# Patient Record
Sex: FEMALE | Race: WHITE | Employment: PART TIME | URBAN - METROPOLITAN AREA
[De-identification: names, ages, dates, MRNs, and addresses within clinical notes are randomized per-mention and may not be internally consistent; named-entity substitution may affect disease eponyms.]

---

## 2017-09-26 ENCOUNTER — OFFICE VISIT (OUTPATIENT)
Dept: FAMILY MEDICINE CLINIC | Age: 27
End: 2017-09-26
Payer: COMMERCIAL

## 2017-09-26 VITALS
DIASTOLIC BLOOD PRESSURE: 72 MMHG | HEART RATE: 74 BPM | BODY MASS INDEX: 31.92 KG/M2 | TEMPERATURE: 98.2 F | WEIGHT: 187 LBS | HEIGHT: 64 IN | SYSTOLIC BLOOD PRESSURE: 112 MMHG | OXYGEN SATURATION: 99 %

## 2017-09-26 DIAGNOSIS — R13.10 DYSPHAGIA, UNSPECIFIED TYPE: Primary | ICD-10-CM

## 2017-09-26 DIAGNOSIS — R00.2 PALPITATION: ICD-10-CM

## 2017-09-26 DIAGNOSIS — L30.9 DERMATITIS: ICD-10-CM

## 2017-09-26 DIAGNOSIS — Z23 FLU VACCINE NEED: ICD-10-CM

## 2017-09-26 PROCEDURE — 93000 ELECTROCARDIOGRAM COMPLETE: CPT | Performed by: PHYSICIAN ASSISTANT

## 2017-09-26 PROCEDURE — 90654 INFLUENZA, TRIV, 18-64 YRS, ID, PF, MICRO INJ, 0.1ML (FLUZONE TRIV, PF, ID): CPT | Performed by: PHYSICIAN ASSISTANT

## 2017-09-26 PROCEDURE — 90471 IMMUNIZATION ADMIN: CPT | Performed by: PHYSICIAN ASSISTANT

## 2017-09-26 PROCEDURE — 99214 OFFICE O/P EST MOD 30 MIN: CPT | Performed by: PHYSICIAN ASSISTANT

## 2017-09-26 RX ORDER — OXCARBAZEPINE 150 MG/1
150 TABLET, FILM COATED ORAL 2 TIMES DAILY
COMMUNITY
End: 2019-05-09 | Stop reason: SDUPTHER

## 2017-09-26 ASSESSMENT — ENCOUNTER SYMPTOMS
SHORTNESS OF BREATH: 0
CONSTIPATION: 0
SORE THROAT: 0
DIARRHEA: 0
ABDOMINAL PAIN: 0
NAUSEA: 0
TROUBLE SWALLOWING: 1
COLOR CHANGE: 0
VOMITING: 0
COUGH: 0
WHEEZING: 0

## 2017-09-27 DIAGNOSIS — R00.2 PALPITATION: ICD-10-CM

## 2018-09-07 ENCOUNTER — OFFICE VISIT (OUTPATIENT)
Dept: FAMILY MEDICINE CLINIC | Age: 28
End: 2018-09-07
Payer: MEDICARE

## 2018-09-07 VITALS
BODY MASS INDEX: 30.9 KG/M2 | HEART RATE: 87 BPM | HEIGHT: 64 IN | DIASTOLIC BLOOD PRESSURE: 68 MMHG | WEIGHT: 181 LBS | SYSTOLIC BLOOD PRESSURE: 114 MMHG | OXYGEN SATURATION: 98 %

## 2018-09-07 DIAGNOSIS — G56.91 NEUROPATHY OF RIGHT UPPER EXTREMITY: Primary | ICD-10-CM

## 2018-09-07 PROCEDURE — G8427 DOCREV CUR MEDS BY ELIG CLIN: HCPCS | Performed by: PHYSICIAN ASSISTANT

## 2018-09-07 PROCEDURE — 99213 OFFICE O/P EST LOW 20 MIN: CPT | Performed by: PHYSICIAN ASSISTANT

## 2018-09-07 PROCEDURE — G8417 CALC BMI ABV UP PARAM F/U: HCPCS | Performed by: PHYSICIAN ASSISTANT

## 2018-09-07 PROCEDURE — 1036F TOBACCO NON-USER: CPT | Performed by: PHYSICIAN ASSISTANT

## 2018-09-07 RX ORDER — IBUPROFEN 800 MG/1
800 TABLET ORAL EVERY 8 HOURS PRN
Qty: 90 TABLET | Refills: 0 | Status: SHIPPED | OUTPATIENT
Start: 2018-09-07 | End: 2019-05-28

## 2018-09-07 ASSESSMENT — ENCOUNTER SYMPTOMS
COLOR CHANGE: 0
BACK PAIN: 0

## 2018-09-07 ASSESSMENT — PATIENT HEALTH QUESTIONNAIRE - PHQ9
1. LITTLE INTEREST OR PLEASURE IN DOING THINGS: 0
SUM OF ALL RESPONSES TO PHQ QUESTIONS 1-9: 0
SUM OF ALL RESPONSES TO PHQ9 QUESTIONS 1 & 2: 0
SUM OF ALL RESPONSES TO PHQ QUESTIONS 1-9: 0
2. FEELING DOWN, DEPRESSED OR HOPELESS: 0

## 2018-09-07 NOTE — PROGRESS NOTES
OXcarbazepine (TRILEPTAL) 150 MG tablet Take 150 mg by mouth 2 times daily       No current facility-administered medications for this visit. Allergies   Allergen Reactions    Latex     Claritin [Loratadine]        Health Maintenance   Topic Date Due    DTaP/Tdap/Td vaccine (1 - Tdap) 09/28/2009    Flu vaccine (1) 09/01/2018    Cervical cancer screen  10/07/2019    HIV screen  Completed       Subjective:      Review of Systems   Constitutional: Negative for activity change, appetite change, fatigue and fever. Musculoskeletal: Positive for myalgias. Negative for arthralgias, back pain, gait problem, joint swelling, neck pain and neck stiffness. Skin: Negative for color change, pallor, rash and wound. Neurological: Positive for numbness. Negative for weakness. Hematological: Negative for adenopathy. Psychiatric/Behavioral: Negative for sleep disturbance. The patient is not nervous/anxious. Objective:     Physical Exam   Constitutional: She is oriented to person, place, and time. She appears well-developed and well-nourished. HENT:   Head: Normocephalic and atraumatic. Musculoskeletal: She exhibits no edema (UE). Right shoulder: She exhibits normal range of motion and no tenderness. Right elbow: She exhibits normal range of motion, no swelling, no effusion, no deformity and no laceration. No tenderness found. Right wrist: She exhibits tenderness (positive tinels/phalens). She exhibits normal range of motion, no bony tenderness, no swelling, no effusion, no crepitus, no deformity and no laceration. Right hand: She exhibits normal range of motion, no tenderness, normal capillary refill and no swelling. Normal sensation noted. Normal strength noted. Neurological: She is alert and oriented to person, place, and time. Skin: Skin is warm and dry. No rash noted. No erythema. No pallor. Psychiatric: She has a normal mood and affect.  Her behavior is normal. Judgment and thought content normal.   Nursing note and vitals reviewed. /68   Pulse 87   Ht 5' 4\" (1.626 m)   Wt 181 lb (82.1 kg)   SpO2 98%   BMI 31.07 kg/m²     Assessment:       Diagnosis Orders   1. Neuropathy of right upper extremity  ibuprofen (IBU) 800 MG tablet    EMG             Plan:      Return in about 3 weeks (around 9/28/2018) for EMG review. Concern for CTS, ? Ulnar involvement as well  Patient will cont with bracing at night  Stretches demonstrated to start 3 times day for 30 sec each  Add ibu  Will order EMG for further eval  Follow up to review after results available  Patient agreed with treatment plan      Orders Placed This Encounter   Procedures    EMG     Standing Status:   Future     Standing Expiration Date:   12/7/2018     Order Specific Question:   Which body part? Answer:   RUE     Orders Placed This Encounter   Medications    ibuprofen (IBU) 800 MG tablet     Sig: Take 1 tablet by mouth every 8 hours as needed for Pain (take with food) Take with food     Dispense:  90 tablet     Refill:  0       Patient given educational materials - see patient instructions. Discussed use, benefit, and side effects of prescribed medications. All patient questions answered. Pt voiced understanding. Reviewed health maintenance. Instructed to continue current medications, diet and exercise. Patient agreed with treatment plan. Follow up as directed.      Electronically signed by Vivian Mo PA-C on 9/7/2018 at 11:20 AM

## 2018-09-07 NOTE — PROGRESS NOTES
Visit Information    Have you changed or started any medications since your last visit including any over-the-counter medicines, vitamins, or herbal medicines? no   Have you stopped taking any of your medications? Is so, why? -  no  Are you having any side effects from any of your medications? - no    Have you seen any other physician or provider since your last visit?  no   Have you had any other diagnostic tests since your last visit?  no   Have you been seen in the emergency room and/or had an admission in a hospital since we last saw you?  no   Have you had your routine dental cleaning in the past 6 months?  no     Do you have an active MyChart account? If no, what is the barrier?   Yes    Patient Care Team:  BRIGHT Moralez - CNP as PCP - General (Nurse Practitioner)    Medical History Review  Past Medical, Family, and Social History reviewed and does contribute to the patient presenting condition    Health Maintenance   Topic Date Due    DTaP/Tdap/Td vaccine (1 - Tdap) 09/28/2009    Flu vaccine (1) 09/01/2018    Cervical cancer screen  10/07/2019    HIV screen  Completed

## 2019-05-09 ENCOUNTER — OFFICE VISIT (OUTPATIENT)
Dept: FAMILY MEDICINE CLINIC | Age: 29
End: 2019-05-09
Payer: MEDICARE

## 2019-05-09 VITALS
SYSTOLIC BLOOD PRESSURE: 112 MMHG | HEIGHT: 64 IN | HEART RATE: 102 BPM | OXYGEN SATURATION: 98 % | DIASTOLIC BLOOD PRESSURE: 74 MMHG | BODY MASS INDEX: 31.79 KG/M2 | WEIGHT: 186.2 LBS

## 2019-05-09 DIAGNOSIS — M75.22 BICIPITAL TENDONITIS OF LEFT SHOULDER: Primary | ICD-10-CM

## 2019-05-09 PROCEDURE — G8417 CALC BMI ABV UP PARAM F/U: HCPCS | Performed by: PHYSICIAN ASSISTANT

## 2019-05-09 PROCEDURE — 1036F TOBACCO NON-USER: CPT | Performed by: PHYSICIAN ASSISTANT

## 2019-05-09 PROCEDURE — G8427 DOCREV CUR MEDS BY ELIG CLIN: HCPCS | Performed by: PHYSICIAN ASSISTANT

## 2019-05-09 PROCEDURE — 99213 OFFICE O/P EST LOW 20 MIN: CPT | Performed by: PHYSICIAN ASSISTANT

## 2019-05-09 RX ORDER — IBUPROFEN 600 MG/1
600 TABLET ORAL EVERY 8 HOURS PRN
Qty: 30 TABLET | Refills: 0 | Status: SHIPPED | OUTPATIENT
Start: 2019-05-09 | End: 2019-05-28

## 2019-05-09 RX ORDER — OXCARBAZEPINE 150 MG/1
150 TABLET, FILM COATED ORAL 2 TIMES DAILY
Qty: 60 TABLET | Refills: 0 | Status: SHIPPED | OUTPATIENT
Start: 2019-05-09 | End: 2019-07-24 | Stop reason: SDUPTHER

## 2019-05-09 ASSESSMENT — ENCOUNTER SYMPTOMS
COLOR CHANGE: 0
BACK PAIN: 0

## 2019-05-09 NOTE — PROGRESS NOTES
[address] [address]  Dept: [phone]  Dept Fax: [phone]     Edi Umana is a 29 y.o. female who presents today for her medical conditions/complaintsas noted below. Edi Umana is c/o of   Chief Complaint   Patient presents with    Shoulder Pain     Left shoulder pain,       HPI:      Shoulder Pain    The pain is present in the left shoulder. This is a chronic problem. The current episode started more than 1 year ago. There has been no history of extremity trauma. The problem occurs daily. The problem has been waxing and waning. The quality of the pain is described as aching. The pain is mild. Pertinent negatives include no fever, inability to bear weight, itching, joint locking, joint swelling, limited range of motion, numbness, stiffness or tingling. The symptoms are aggravated by activity. She has tried NSAIDS for the symptoms. The treatment provided mild relief. patient states joints were hyper when she was younger she would twist her shoulders around behind her back. Has had off and on pain through her whole life. Noticing it more recently. No new truama    Patient also requesting refill on her trileptal. She is inbetween psychiatry appointments and is requesting refill until she is in with her new doctor.  Still seeing counseling regulalry    No results found for: LABA1C          ( goal A1Cis < 7)   No results found for: LABMICR  No results found for: LDLCHOLESTEROL, LDLCALC    (goal LDL is <100)   AST (U/L)   Date Value   02/05/2017 21     ALT (U/L)   Date Value   02/05/2017 14     BUN (mg/dL)   Date Value   02/05/2017 14     BP Readings from Last 3 Encounters:   05/09/19 112/74   09/07/18 114/68   09/26/17 112/72          (goal 120/80)    Past Medical History:   Diagnosis Date    Anxiety     Borderline personality disorder (HCC)     Depression     Dysphagia     Latex allergy     Psoriasis       History reviewed. No pertinent surgical history. History reviewed. No pertinent family history. Social History     Tobacco Use    Smoking status: Never Smoker    Smokeless tobacco: Never Used   Substance Use Topics    Alcohol use: No         Current Outpatient Medications   Medication Sig Dispense Refill    ibuprofen (ADVIL;MOTRIN) 600 MG tablet Take 1 tablet by mouth every 8 hours as needed for Pain 30 tablet 0    OXcarbazepine (TRILEPTAL) 150 MG tablet Take 1 tablet by mouth 2 times daily 60 tablet 0    ibuprofen (IBU) 800 MG tablet Take 1 tablet by mouth every 8 hours as needed for Pain (take with food) Take with food 90 tablet 0     No current facility-administered medications for this visit. Allergies   Allergen Reactions    Latex     Claritin [Loratadine]        Health Maintenance   Topic Date Due    Varicella Vaccine (1 of 2 - 13+ 2-dose series) 09/28/2003    DTaP/Tdap/Td vaccine (1 - Tdap) 09/28/2009    Flu vaccine (Season Ended) 09/01/2019    Cervical cancer screen  10/07/2019    HIV screen  Completed    Pneumococcal 0-64 years Vaccine  Aged Out       Subjective:     Review of Systems   Constitutional: Negative for activity change, appetite change, fatigue and fever. Musculoskeletal: Positive for arthralgias. Negative for back pain, gait problem, joint swelling, myalgias and stiffness. Skin: Negative for color change, itching, pallor, rash and wound. Neurological: Negative for tingling, weakness and numbness. Hematological: Negative for adenopathy. Psychiatric/Behavioral: The patient is not nervous/anxious. Objective:     Physical Exam   Constitutional: She is oriented to person, place, and time. She appears well-developed and well-nourished. HENT:   Head: Normocephalic and atraumatic. Musculoskeletal:        Left shoulder: She exhibits tenderness (over bicipital tendon).  She exhibits normal range of motion, no bony tenderness, no swelling, no

## 2019-05-09 NOTE — PATIENT INSTRUCTIONS
Patient Education        Shoulder Stretches: Exercises  Your Care Instructions  Here are some examples of exercises for your shoulder. Start each exercise slowly. Ease off the exercise if you start to have pain. Your doctor or physical therapist will tell you when you can start these exercises and which ones will work best for you. How to do the exercises  Shoulder stretch    1.  a doorway and place one arm against the door frame. Your elbow should be a little higher than your shoulder. 2. Relax your shoulders as you lean forward, allowing your chest and shoulder muscles to stretch. You can also turn your body slightly away from your arm to stretch the muscles even more. 3. Hold for 15 to 30 seconds. 4. Repeat 2 to 4 times with each arm. Shoulder and chest stretch    1. Shoulder and chest stretch  2. While sitting, relax your upper body so you slump slightly in your chair. 3. As you breathe in, straighten your back and open your arms out to the sides. 4. Gently pull your shoulder blades back and downward. 5. Hold for 15 to 30 seconds as your breathe normally. 6. Repeat 2 to 4 times. Overhead stretch    1. Reach up over your head with both arms. 2. Hold for 15 to 30 seconds. 3. Repeat 2 to 4 times. Follow-up care is a key part of your treatment and safety. Be sure to make and go to all appointments, and call your doctor if you are having problems. It's also a good idea to know your test results and keep a list of the medicines you take. Where can you learn more? Go to https://[email or web address]. org and sign in to your [employer] account. Enter S254 in the [facility] box to learn more about \"Shoulder Stretches: Exercises. \"     If you do not have an account, please click on the \"Sign Up Now\" link. Current as of: September 20, 2018  Content Version: 12.0  © 2290-2502 Healthwise, Inc[employer]. Care instructions adapted under license by [facility] ([facility]).  If you have questions about a medical condition or this instruction, always ask your healthcare professional. [place] [address] any warranty or liability for your use of this information.

## 2019-05-09 NOTE — PROGRESS NOTES
Visit Information    Have you changed or started any medications since your last visit including any over-the-counter medicines, vitamins, or herbal medicines? no   Have you stopped taking any of your medications? Is so, why? -  no  Are you having any side effects from any of your medications? - no    Have you seen any other physician or provider since your last visit?  no   Have you had any other diagnostic tests since your last visit?  no   Have you been seen in the emergency room and/or had an admission in a hospital since we last saw you?  no   Have you had your routine dental cleaning in the past 6 months?  no     Do you have an active MyChart account? If no, what is the barrier?   Yes    Patient Care Team:  Chantal Antonio PA-C as PCP - General (Family Medicine)    Medical History Review  Past Medical, Family, and Social History reviewed and does contribute to the patient presenting condition    Health Maintenance   Topic Date Due    Varicella Vaccine (1 of 2 - 13+ 2-dose series) 09/28/2003    DTaP/Tdap/Td vaccine (1 - Tdap) 09/28/2009    Flu vaccine (Season Ended) 09/01/2019    Cervical cancer screen  10/07/2019    HIV screen  Completed    Pneumococcal 0-64 years Vaccine  Aged Out

## 2019-05-28 ENCOUNTER — OFFICE VISIT (OUTPATIENT)
Dept: FAMILY MEDICINE CLINIC | Age: 29
End: 2019-05-28
Payer: MEDICARE

## 2019-05-28 VITALS
DIASTOLIC BLOOD PRESSURE: 70 MMHG | BODY MASS INDEX: 32.1 KG/M2 | SYSTOLIC BLOOD PRESSURE: 110 MMHG | WEIGHT: 188 LBS | HEART RATE: 103 BPM | HEIGHT: 64 IN | OXYGEN SATURATION: 98 %

## 2019-05-28 DIAGNOSIS — M25.512 ACUTE PAIN OF LEFT SHOULDER: Primary | ICD-10-CM

## 2019-05-28 PROCEDURE — G8427 DOCREV CUR MEDS BY ELIG CLIN: HCPCS | Performed by: PHYSICIAN ASSISTANT

## 2019-05-28 PROCEDURE — 99213 OFFICE O/P EST LOW 20 MIN: CPT | Performed by: PHYSICIAN ASSISTANT

## 2019-05-28 PROCEDURE — 1036F TOBACCO NON-USER: CPT | Performed by: PHYSICIAN ASSISTANT

## 2019-05-28 PROCEDURE — G8417 CALC BMI ABV UP PARAM F/U: HCPCS | Performed by: PHYSICIAN ASSISTANT

## 2019-05-28 ASSESSMENT — ENCOUNTER SYMPTOMS
COLOR CHANGE: 0
BACK PAIN: 0

## 2019-05-28 NOTE — PROGRESS NOTES
[address] [facility]  [address] [place], [place] [address] [address]  Dept: [phone]  Dept Fax: [phone]     Jaky Valencia is a 29 y.o. female who presents today for her medical conditions/complaintsas noted below. Jaky Valencia is c/o of   Chief Complaint   Patient presents with    Shoulder Pain     Left shoulder pain. HPI:      Shoulder Pain    The pain is present in the left shoulder. This is a new problem. The current episode started more than 1 month ago. There has been no history of extremity trauma. The problem occurs constantly. The problem has been gradually worsening. The quality of the pain is described as aching. The pain is mild. Associated symptoms include a limited range of motion. Pertinent negatives include no fever, inability to bear weight, itching, joint locking, joint swelling, numbness, stiffness or tingling. She has tried NSAIDS for the symptoms. The treatment provided no relief. patient tried nsaid but was not helping much  Physically active at work but trying not to lift over 10 pounds  No new trauma    No results found for: LABA1C          ( goal A1Cis < 7)   No results found for: LABMICR  No results found for: LDLCHOLESTEROL, LDLCALC    (goal LDL is <100)   AST (U/L)   Date Value   02/05/2017 21     ALT (U/L)   Date Value   02/05/2017 14     BUN (mg/dL)   Date Value   02/05/2017 14     BP Readings from Last 3 Encounters:   05/28/19 110/70   05/09/19 112/74   09/07/18 114/68          (goal 120/80)    Past Medical History:   Diagnosis Date    Anxiety     Borderline personality disorder ([facility] [address]     Depression     Dysphagia     Latex allergy     Psoriasis       History reviewed. No pertinent surgical history. History reviewed. No pertinent family history. Social History     Tobacco Use    Smoking status: Never Smoker    Smokeless tobacco: Never Used   Substance Use Topics    Alcohol use:  No Current Outpatient Medications   Medication Sig Dispense Refill    OXcarbazepine (TRILEPTAL) 150 MG tablet Take 1 tablet by mouth 2 times daily 60 tablet 0     No current facility-administered medications for this visit. Allergies   Allergen Reactions    Latex     Claritin [Loratadine]        Health Maintenance   Topic Date Due    Varicella Vaccine (1 of 2 - 13+ 2-dose series) 09/28/2003    DTaP/Tdap/Td vaccine (1 - Tdap) 09/28/2009    Flu vaccine (Season Ended) 09/01/2019    Cervical cancer screen  10/07/2019    HIV screen  Completed    Pneumococcal 0-64 years Vaccine  Aged Out       Subjective:     Review of Systems   Constitutional: Negative for activity change, appetite change, fatigue and fever. Musculoskeletal: Positive for arthralgias. Negative for back pain, gait problem, joint swelling, myalgias and stiffness. Skin: Negative for color change, itching, pallor, rash and wound. Neurological: Negative for tingling, weakness and numbness. Hematological: Negative for adenopathy. Psychiatric/Behavioral: Negative for sleep disturbance. The patient is not nervous/anxious. Objective:     Physical Exam   Constitutional: She is oriented to person, place, and time. She appears well-developed and well-nourished. HENT:   Head: Normocephalic and atraumatic. Musculoskeletal:        Left shoulder: She exhibits decreased range of motion and tenderness. She exhibits no bony tenderness, no swelling, no effusion, no crepitus, no deformity, no laceration, no pain, no spasm and normal pulse. Neurological: She is alert and oriented to person, place, and time. No cranial nerve deficit. Coordination normal.   Skin: Skin is warm and dry. No rash noted. No erythema. No pallor. Psychiatric: She has a normal mood and affect. Her behavior is normal. Judgment and thought content normal.   Nursing note and vitals reviewed.     /70   Pulse 103   Ht 5' 4\" (1.626 m)   Wt 188 lb (85.3 kg)   SpO2 98%   BMI 32.27 kg/m²     Assessment:          Diagnosis Orders   1. Acute pain of left shoulder  XR SHOULDER LEFT (MIN 2 VIEWS)    Amb External Referral To Physical Therapy             Plan:      Return if symptoms worsen or fail to improve. Xray today, no noted fractures  Await rad report  Set up to start PT  Heat/ice to area. nsaid with food if helps  Patient agreed with treatment plan  Follow up if persisting    Orders Placed This Encounter   Procedures    XR SHOULDER LEFT (MIN 2 VIEWS)     Standing Status:   Future     Number of Occurrences:   1     Standing Expiration Date:   5/27/2020     Order Specific Question:   Reason for exam:     Answer:   pain    Amb External Referral To Physical Therapy     Referral Priority:   Routine     Referral Type:   Consult for Advice and Opinion     Referral Reason:   Specialty Services Required     Requested Specialty:   Physical Therapy     Number of Visits Requested:   1          Patient given educational materials - see patient instructions. Discussed use, benefit, and side effects of prescribed medications. All patientquestions answered. Pt voiced understanding. Reviewed health maintenance. Instructedto continue current medications, diet and exercise. Patient agreed with treatmentplan. Follow up as directed.      Electronically signed by Bushra Fallon PA-C on 5/28/2019 at 3:38 PM

## 2019-05-28 NOTE — PROGRESS NOTES
Visit Information    Have you changed or started any medications since your last visit including any over-the-counter medicines, vitamins, or herbal medicines? no   Have you stopped taking any of your medications? Is so, why? -  no  Are you having any side effects from any of your medications? - no    Have you seen any other physician or provider since your last visit?  no   Have you had any other diagnostic tests since your last visit?  no   Have you been seen in the emergency room and/or had an admission in a hospital since we last saw you?  no   Have you had your routine dental cleaning in the past 6 months?  no     Do you have an active MyChart account? If no, what is the barrier?   Yes    Patient Care Team:  Chetna Norris PA-C as PCP - General (Family Medicine)    Medical History Review  Past Medical, Family, and Social History reviewed and does contribute to the patient presenting condition    Health Maintenance   Topic Date Due    Varicella Vaccine (1 of 2 - 13+ 2-dose series) 09/28/2003    DTaP/Tdap/Td vaccine (1 - Tdap) 09/28/2009    Flu vaccine (Season Ended) 09/01/2019    Cervical cancer screen  10/07/2019    HIV screen  Completed    Pneumococcal 0-64 years Vaccine  Aged Out

## 2019-06-12 ENCOUNTER — APPOINTMENT (OUTPATIENT)
Dept: CT IMAGING | Age: 29
End: 2019-06-12
Payer: MEDICARE

## 2019-06-12 ENCOUNTER — HOSPITAL ENCOUNTER (EMERGENCY)
Age: 29
Discharge: HOME | End: 2019-06-12
Attending: EMERGENCY MEDICINE
Payer: MEDICARE

## 2019-06-12 VITALS
HEIGHT: 64 IN | OXYGEN SATURATION: 100 % | DIASTOLIC BLOOD PRESSURE: 71 MMHG | SYSTOLIC BLOOD PRESSURE: 107 MMHG | RESPIRATION RATE: 16 BRPM | TEMPERATURE: 97.7 F | WEIGHT: 188.1 LBS | HEART RATE: 83 BPM | BODY MASS INDEX: 32.11 KG/M2

## 2019-06-12 DIAGNOSIS — R51.9 ACUTE NONINTRACTABLE HEADACHE, UNSPECIFIED HEADACHE TYPE: Primary | ICD-10-CM

## 2019-06-12 PROCEDURE — 70450 CT HEAD/BRAIN W/O DYE: CPT

## 2019-06-12 PROCEDURE — 6360000002 HC RX W HCPCS: Performed by: EMERGENCY MEDICINE

## 2019-06-12 PROCEDURE — 99283 EMERGENCY DEPT VISIT LOW MDM: CPT

## 2019-06-12 PROCEDURE — 96372 THER/PROPH/DIAG INJ SC/IM: CPT

## 2019-06-12 RX ORDER — BUTALBITAL, ACETAMINOPHEN AND CAFFEINE 300; 40; 50 MG/1; MG/1; MG/1
1 CAPSULE ORAL EVERY 6 HOURS PRN
Qty: 20 CAPSULE | Refills: 0 | Status: SHIPPED | OUTPATIENT
Start: 2019-06-12 | End: 2020-08-17

## 2019-06-12 RX ORDER — KETOROLAC TROMETHAMINE 30 MG/ML
60 INJECTION, SOLUTION INTRAMUSCULAR; INTRAVENOUS ONCE
Status: COMPLETED | OUTPATIENT
Start: 2019-06-12 | End: 2019-06-12

## 2019-06-12 RX ADMIN — KETOROLAC TROMETHAMINE 60 MG: 30 INJECTION, SOLUTION INTRAMUSCULAR at 02:00

## 2019-06-12 ASSESSMENT — ENCOUNTER SYMPTOMS
SHORTNESS OF BREATH: 0
CONSTIPATION: 0
COLOR CHANGE: 0
VOMITING: 0
EYE REDNESS: 0
ABDOMINAL PAIN: 0
EYE DISCHARGE: 0
COUGH: 0
DIARRHEA: 0
FACIAL SWELLING: 0
EYE PAIN: 1

## 2019-06-12 ASSESSMENT — VISUAL ACUITY
OD: 20/70
OU: 20/100
OS: 20/100

## 2019-06-12 ASSESSMENT — PAIN SCALES - GENERAL
PAINLEVEL_OUTOF10: 7
PAINLEVEL_OUTOF10: 7

## 2019-06-12 NOTE — LETTER
[facility] ED  [facility] [address]  Phone: [phone]               June 12, 2019    Patient: Selene Delacruz   YOB: 1990   Date of Visit: 6/12/2019       To Whom It May Concern:    Veronica Cyr was seen and treated in our emergency department on 6/12/2019. She may return to work on 6/13/2019.       Sincerely,       Summer Araya RN         Signature:__________________________________

## 2019-06-12 NOTE — ED PROVIDER NOTES
chest pain. Gastrointestinal: Negative for abdominal pain, constipation, diarrhea and vomiting. Genitourinary: Negative for dysuria and hematuria. Musculoskeletal: Negative for arthralgias. Skin: Negative for color change and rash. Neurological: Negative for syncope, numbness and headaches. Hematological: Negative for adenopathy. Psychiatric/Behavioral: Negative for confusion. The patient is not nervous/anxious. Except as noted above the remainder of the review of systems was reviewed and negative. PHYSICAL EXAM    (up to 7 for level 4, 8 or more for level 5)     Vitals:    06/12/19 0127 06/12/19 0129   BP: 107/71    Pulse: 83    Resp: 16    Temp: 97.7 °F (36.5 °C)    TempSrc: Oral    SpO2: 100%    Weight:  188 lb 1.6 oz (85.3 kg)   Height:  5' 4\" (1.626 m)       Physical Exam   Constitutional: She is oriented to person, place, and time. She appears well-developed and well-nourished. No distress. She is lying in a darkened room with a towel over her eyes. HENT:   Head: Normocephalic and atraumatic. Eyes: Pupils are equal, round, and reactive to light. Conjunctivae and EOM are normal. Right eye exhibits no discharge. Left eye exhibits no discharge. No scleral icterus. Neck: Neck supple. Cardiovascular: Normal rate and regular rhythm. Pulmonary/Chest: Effort normal and breath sounds normal. No stridor. No respiratory distress. She has no wheezes. She has no rales. Abdominal: Soft. She exhibits no distension. There is no tenderness. Musculoskeletal: Normal range of motion. Lymphadenopathy:     She has no cervical adenopathy. Neurological: She is alert and oriented to person, place, and time. Skin: Skin is warm and dry. No rash noted. She is not diaphoretic. No erythema. Psychiatric: She has a normal mood and affect. Her behavior is normal.   Vitals reviewed.           DIAGNOSTIC RESULTS     EKG: All EKG's are interpreted by the Emergency Department Physician who either signs or Co-signs this chart in the absence of a cardiologist.    Not indicated    RADIOLOGY:   Non-plain film images such as CT, Ultrasound and MRI are read by the radiologist. Plain radiographic images are visualized and preliminarily interpreted by the emergency physician with the below findings:    Interpretation per the Radiologist below, if available at the time of this note:    Ct Head Wo Contrast    Result Date: 6/12/2019  EXAMINATION: CT OF THE HEAD WITHOUT CONTRAST  6/12/2019 1:46 am TECHNIQUE: CT of the head was performed without the administration of intravenous contrast. Dose modulation, iterative reconstruction, and/or weight based adjustment of the mA/kV was utilized to reduce the radiation dose to as low as reasonably achievable. COMPARISON: None. HISTORY: ORDERING SYSTEM PROVIDED HISTORY: bilateral eye pain TECHNOLOGIST PROVIDED HISTORY: FINDINGS: BRAIN/VENTRICLES: There is no acute intracranial hemorrhage, mass effect, or midline shift. There is satisfactory overall gray-white matter differentiation. The ventricular structures are symmetric and unremarkable. The infratentorial structures are unremarkable. ORBITS: The visualized portion of the orbits demonstrate no acute abnormality. SINUSES: The visualized paranasal sinuses and mastoid air cells demonstrate no acute abnormality. SOFT TISSUES/SKULL:  No acute abnormality of the visualized skull or soft tissues. No acute intracranial abnormality. LABS:  Labs Reviewed - No data to display    All other labs were within normal range or not returned as of this dictation.     EMERGENCY DEPARTMENT COURSE and DIFFERENTIAL DIAGNOSIS/MDM:   Vitals:    Vitals:    06/12/19 0127 06/12/19 0129   BP: 107/71    Pulse: 83    Resp: 16    Temp: 97.7 °F (36.5 °C)    TempSrc: Oral    SpO2: 100%    Weight:  188 lb 1.6 oz (85.3 kg)   Height:  5' 4\" (1.626 m)       Orders Placed This Encounter   Medications    ketorolac (TORADOL) injection 60 mg    butalbital-APAP-caffeine (FIORICET) -40 MG CAPS per capsule     Sig: Take 1 capsule by mouth every 6 hours as needed for Headaches     Dispense:  20 capsule     Refill:  0       Medical Decision Making: CT is negative and she is feeling much improved with the IM shot of Toradol. She is discharged home was recommended to have an eye exam.  Treatment diagnosis and follow-up were discussed with the patient. CONSULTS:  None    PROCEDURES:  None    FINAL IMPRESSION      1. Acute nonintractable headache, unspecified headache type          DISPOSITION/PLAN   DISPOSITION Decision To Discharge 06/12/2019 02:32:22 AM      PATIENT REFERRED TO:   Manuel Gonzalez PA-C  [address]   [facility] [address]  [phone]      As needed    [facility] ED  [address] [facility]  [phone]    If symptoms worsen      DISCHARGE MEDICATIONS:     New Prescriptions    BUTALBITAL-APAP-CAFFEINE (FIORICET) -40 MG CAPS PER CAPSULE    Take 1 capsule by mouth every 6 hours as needed for Headaches         (Please note that portions of this note were completed with a voice recognition program.  Efforts were made to edit the dictations but occasionally words are mis-transcribed.)    Shauna Egan MD  Attending Emergency Physician           Shauan Egan MD  06/12/19 2907 Guerrero Delgado MD  06/12/19 7825

## 2019-07-24 RX ORDER — OXCARBAZEPINE 150 MG/1
TABLET, FILM COATED ORAL
Qty: 60 TABLET | Refills: 0 | OUTPATIENT
Start: 2019-07-24

## 2019-07-24 RX ORDER — OXCARBAZEPINE 150 MG/1
150 TABLET, FILM COATED ORAL 2 TIMES DAILY
Qty: 60 TABLET | Refills: 1 | Status: SHIPPED | OUTPATIENT
Start: 2019-07-24 | End: 2019-09-18 | Stop reason: SDUPTHER

## 2019-09-18 RX ORDER — OXCARBAZEPINE 150 MG/1
TABLET, FILM COATED ORAL
Qty: 60 TABLET | Refills: 1 | Status: SHIPPED | OUTPATIENT
Start: 2019-09-18 | End: 2019-11-24 | Stop reason: SDUPTHER

## 2019-11-24 RX ORDER — OXCARBAZEPINE 150 MG/1
TABLET, FILM COATED ORAL
Qty: 60 TABLET | Refills: 1 | Status: SHIPPED | OUTPATIENT
Start: 2019-11-24

## 2020-01-01 ENCOUNTER — HOSPITAL ENCOUNTER (EMERGENCY)
Age: 30
Discharge: HOME | End: 2020-01-01
Attending: EMERGENCY MEDICINE
Payer: MEDICARE

## 2020-01-01 VITALS
RESPIRATION RATE: 16 BRPM | SYSTOLIC BLOOD PRESSURE: 113 MMHG | OXYGEN SATURATION: 98 % | DIASTOLIC BLOOD PRESSURE: 69 MMHG | HEIGHT: 64 IN | HEART RATE: 103 BPM | TEMPERATURE: 97.4 F | WEIGHT: 195.13 LBS | BODY MASS INDEX: 33.31 KG/M2

## 2020-01-01 PROCEDURE — 99283 EMERGENCY DEPT VISIT LOW MDM: CPT

## 2020-01-01 RX ORDER — MECLIZINE HYDROCHLORIDE 25 MG/1
25 TABLET ORAL EVERY 8 HOURS PRN
Qty: 20 TABLET | Refills: 0 | Status: SHIPPED | OUTPATIENT
Start: 2020-01-01 | End: 2020-01-06 | Stop reason: ALTCHOICE

## 2020-01-01 ASSESSMENT — ENCOUNTER SYMPTOMS
EYE DISCHARGE: 0
CONSTIPATION: 0
EYE REDNESS: 0
COUGH: 0
ABDOMINAL PAIN: 0
DIARRHEA: 0
VOMITING: 0
COLOR CHANGE: 0
FACIAL SWELLING: 0
SHORTNESS OF BREATH: 0

## 2020-01-01 ASSESSMENT — PAIN SCALES - GENERAL: PAINLEVEL_OUTOF10: 4

## 2020-01-01 NOTE — ED PROVIDER NOTES
for chills, fatigue and fever. HENT: Positive for ear discharge and ear pain. Negative for congestion and facial swelling. Eyes: Negative for discharge and redness. Respiratory: Negative for cough and shortness of breath. Cardiovascular: Negative for chest pain. Gastrointestinal: Negative for abdominal pain, constipation, diarrhea and vomiting. Genitourinary: Negative for dysuria and hematuria. Musculoskeletal: Negative for arthralgias. Skin: Negative for color change and rash. Neurological: Positive for dizziness. Negative for syncope, numbness and headaches. Hematological: Negative for adenopathy. Psychiatric/Behavioral: Negative for confusion. The patient is not nervous/anxious. Except as noted above the remainder of the review of systems was reviewed and negative. PHYSICAL EXAM    (up to 7 for level 4, 8 or more for level 5)     Vitals:    01/01/20 0956   BP: 113/69   Pulse: 103   Resp: 16   Temp: 97.4 °F (36.3 °C)   TempSrc: Oral   SpO2: 98%   Weight: 195 lb 2 oz (88.5 kg)   Height: 5' 4\" (1.626 m)       Physical Exam  Vitals signs reviewed. Constitutional:       General: She is not in acute distress. Appearance: She is well-developed. She is not diaphoretic. HENT:      Head: Normocephalic and atraumatic. Ears:      Comments: Both external canals have some purulent drainage. Eyes:      General: No scleral icterus. Right eye: No discharge. Left eye: No discharge. Neck:      Musculoskeletal: Neck supple. Cardiovascular:      Rate and Rhythm: Normal rate and regular rhythm. Pulmonary:      Effort: Pulmonary effort is normal. No respiratory distress. Breath sounds: Normal breath sounds. No stridor. No wheezing or rales. Abdominal:      General: There is no distension. Palpations: Abdomen is soft. Tenderness: There is no tenderness. Musculoskeletal: Normal range of motion.    Lymphadenopathy:      Cervical: No cervical adenopathy. Skin:     General: Skin is warm and dry. Findings: No erythema or rash. Neurological:      Mental Status: She is alert and oriented to person, place, and time. Psychiatric:         Behavior: Behavior normal.             DIAGNOSTIC RESULTS     EKG: All EKG's are interpreted by the Emergency Department Physician who either signs or Co-signs this chart in the absence of a cardiologist.    Not indicated    RADIOLOGY:   Non-plain film images such as CT, Ultrasound and MRI are read by the radiologist. Plain radiographic images are visualized and preliminarily interpreted by the emergency physician with the below findings:    Not indicated    Interpretation per the Radiologist below, if available at the time of this note:        LABS:  Labs Reviewed - No data to display    All other labs were within normal range or not returned as of this dictation. EMERGENCY DEPARTMENT COURSE and DIFFERENTIAL DIAGNOSIS/MDM:   Vitals:    Vitals:    01/01/20 0956   BP: 113/69   Pulse: 103   Resp: 16   Temp: 97.4 °F (36.3 °C)   TempSrc: Oral   SpO2: 98%   Weight: 195 lb 2 oz (88.5 kg)   Height: 5' 4\" (1.626 m)       Orders Placed This Encounter   Medications    neomycin-polymyxin-hydrocortisone (CORTISPORIN) 3.5-46674-9 otic solution     Sig: Place 3 drops into both ears 3 times daily for 10 days Instill into both ears     Dispense:  1 Bottle     Refill:  0    meclizine (ANTIVERT) 25 MG tablet     Sig: Take 1 tablet by mouth every 8 hours as needed for Dizziness     Dispense:  20 tablet     Refill:  0       Medical Decision Making: The patient is placed on antibiotic eardrops and will see her doctor in a few days. She will also be treated with Antivert. CONSULTS:  None    PROCEDURES:  None    FINAL IMPRESSION      1.  Acute otitis externa of both ears, unspecified type          DISPOSITION/PLAN   DISPOSITION Decision To Discharge 01/01/2020 10:34:09 AM      PATIENT REFERRED TO:   Kyle Denis PA-C  [id number]

## 2020-01-06 ENCOUNTER — OFFICE VISIT (OUTPATIENT)
Dept: FAMILY MEDICINE CLINIC | Age: 30
End: 2020-01-06
Payer: MEDICARE

## 2020-01-06 VITALS
OXYGEN SATURATION: 99 % | WEIGHT: 195 LBS | BODY MASS INDEX: 33.29 KG/M2 | HEIGHT: 64 IN | TEMPERATURE: 97.8 F | HEART RATE: 90 BPM | SYSTOLIC BLOOD PRESSURE: 120 MMHG | DIASTOLIC BLOOD PRESSURE: 74 MMHG

## 2020-01-06 PROBLEM — F32.A DEPRESSION: Status: ACTIVE | Noted: 2018-07-24

## 2020-01-06 PROBLEM — F41.9 ANXIETY: Status: ACTIVE | Noted: 2018-07-24

## 2020-01-06 PROBLEM — L40.9 PSORIASIS: Status: ACTIVE | Noted: 2018-07-24

## 2020-01-06 PROCEDURE — 99213 OFFICE O/P EST LOW 20 MIN: CPT | Performed by: PHYSICIAN ASSISTANT

## 2020-01-06 PROCEDURE — 1036F TOBACCO NON-USER: CPT | Performed by: PHYSICIAN ASSISTANT

## 2020-01-06 PROCEDURE — 90686 IIV4 VACC NO PRSV 0.5 ML IM: CPT | Performed by: PHYSICIAN ASSISTANT

## 2020-01-06 PROCEDURE — 90471 IMMUNIZATION ADMIN: CPT | Performed by: PHYSICIAN ASSISTANT

## 2020-01-06 PROCEDURE — G8417 CALC BMI ABV UP PARAM F/U: HCPCS | Performed by: PHYSICIAN ASSISTANT

## 2020-01-06 PROCEDURE — G8482 FLU IMMUNIZE ORDER/ADMIN: HCPCS | Performed by: PHYSICIAN ASSISTANT

## 2020-01-06 PROCEDURE — 69210 REMOVE IMPACTED EAR WAX UNI: CPT | Performed by: PHYSICIAN ASSISTANT

## 2020-01-06 PROCEDURE — G8427 DOCREV CUR MEDS BY ELIG CLIN: HCPCS | Performed by: PHYSICIAN ASSISTANT

## 2020-01-06 ASSESSMENT — ENCOUNTER SYMPTOMS
COUGH: 0
COLOR CHANGE: 0
RHINORRHEA: 0
DIARRHEA: 0
ABDOMINAL PAIN: 0

## 2020-01-06 NOTE — PROGRESS NOTES
neomycin-polymyxin-hydrocortisone (CORTISPORIN) 3.5-14971-9 otic solution Place 3 drops into both ears 3 times daily for 10 days Instill into both ears 1 Bottle 0    OXcarbazepine (TRILEPTAL) 150 MG tablet take 1 tablet by mouth twice a day 60 tablet 1    butalbital-APAP-caffeine (FIORICET) -40 MG CAPS per capsule Take 1 capsule by mouth every 6 hours as needed for Headaches 20 capsule 0     No current facility-administered medications for this visit. Allergies   Allergen Reactions    Latex     Claritin [Loratadine]        Health Maintenance   Topic Date Due    Varicella Vaccine (1 of 2 - 2-dose childhood series) 09/28/1991    Cervical cancer screen  10/07/2019    DTaP/Tdap/Td vaccine (2 - Td) 07/24/2028    Flu vaccine  Completed    HIV screen  Completed    Pneumococcal 0-64 years Vaccine  Aged Out       Subjective:     Review of Systems   Constitutional: Negative for activity change, appetite change, fatigue, fever and unexpected weight change. HENT: Positive for ear pain and hearing loss. Negative for ear discharge and rhinorrhea. Respiratory: Negative for cough. Gastrointestinal: Negative for abdominal pain and diarrhea. Skin: Negative for color change, pallor, rash and wound. Neurological: Negative for weakness and numbness. Hematological: Negative for adenopathy. Psychiatric/Behavioral: The patient is not nervous/anxious. Objective:     Physical Exam  Vitals signs and nursing note reviewed. Constitutional:       General: She is not in acute distress. Appearance: Normal appearance. She is well-developed. She is not ill-appearing. HENT:      Head: Normocephalic and atraumatic. Right Ear: Tympanic membrane, ear canal and external ear normal. No tenderness. No middle ear effusion. There is impacted cerumen. No mastoid tenderness. Tympanic membrane is not perforated, erythematous, retracted or bulging.       Left Ear: Tympanic membrane, ear canal and external ear normal. No tenderness. No middle ear effusion. There is impacted cerumen. No mastoid tenderness. Tympanic membrane is not perforated, erythematous, retracted or bulging. Ears:     Skin:     General: Skin is warm and dry. Coloration: Skin is not pale. Findings: No erythema or rash. Neurological:      General: No focal deficit present. Mental Status: She is alert and oriented to person, place, and time. Psychiatric:         Mood and Affect: Mood normal.         Behavior: Behavior normal.         Thought Content: Thought content normal.         Judgment: Judgment normal.       /74   Pulse 90   Temp 97.8 °F (36.6 °C) (Tympanic)   Ht 5' 4\" (1.626 m)   Wt 195 lb (88.5 kg)   LMP 12/18/2019   SpO2 99%   BMI 33.47 kg/m²     Assessment:          Diagnosis Orders   1. Bilateral impacted cerumen  04740 - ND REMOVE IMPACTED EAR WAX   2. Need for immunization against influenza  INFLUENZA, QUADV, 0.5ML, 6 MO AND OLDER, IM, PF, PREFILL SYR OR SDV (FLUZONE QUADV, PF)             Plan:      Return if symptoms worsen or fail to improve. Patient toerated flush well. Canal and TM wnl after  Complete remaining drops she has from ER visit  Patient agreed with treatment plan  Health Maintenance reviewed - Flu shot given. Orders Placed This Encounter   Procedures    INFLUENZA, QUADV, 0.5ML, 6 MO AND OLDER, IM, PF, PREFILL SYR OR SDV (FLUZONE QUADV, PF)    94542 - ND REMOVE IMPACTED EAR WAX          Patient given educational materials - see patient instructions. Discussed use, benefit, and side effects of prescribed medications. All patientquestions answered. Pt voiced understanding. Reviewed health maintenance. Instructedto continue current medications, diet and exercise. Patient agreed with treatmentplan. Follow up as directed.      Electronically signed by Tena Harris PA-C on 1/6/2020 at 3:33 PM

## 2020-01-06 NOTE — PROGRESS NOTES
Visit Information    Have you changed or started any medications since your last visit including any over-the-counter medicines, vitamins, or herbal medicines? no   Have you stopped taking any of your medications? Is so, why? -  no  Are you having any side effects from any of your medications? - no    Have you seen any other physician or provider since your last visit?  no   Have you had any other diagnostic tests since your last visit?  no   Have you been seen in the emergency room and/or had an admission in a hospital since we last saw you?  no   Have you had your routine dental cleaning in the past 6 months?  no     Do you have an active MyChart account? If no, what is the barrier? Yes    Patient Care Team:  Kyle Denis PA-C as PCP - General (Family Medicine)  Kyle Denis PA-C as PCP - [facility]    Medical History Review  Past Medical, Family, and Social History reviewed and does contribute to the patient presenting condition    Health Maintenance   Topic Date Due    Varicella Vaccine (1 of 2 - 2-dose childhood series) 09/28/1991    DTaP/Tdap/Td vaccine (1 - Tdap) 09/28/2001    Flu vaccine (1) 09/01/2019    Cervical cancer screen  10/07/2019    HIV screen  Completed    Pneumococcal 0-64 years Vaccine  Aged Out       After obtaining consent, and per orders of Dr. Avelina Mclaughlin, injection of FLU VACCINE given in Right deltoid by Madelyn Mcelroy. Patient instructed to remain in clinic for 20 minutes afterwards, and to report any adverse reaction to me immediately. Vaccine Information Sheet, \"Influenza - Inactivated\"  given to Tyrel Cisneros, or parent/legal guardian of  Tyrel Cisneros and verbalized understanding. Patient responses:    Have you ever had a reaction to a flu vaccine? No  Are you able to eat eggs without adverse effects? Yes  Do you have any current illness? No  Have you ever had Guillian Paterson Syndrome? No    Flu vaccine given per order.  Please see immunization

## 2020-01-15 ENCOUNTER — OFFICE VISIT (OUTPATIENT)
Dept: FAMILY MEDICINE CLINIC | Age: 30
End: 2020-01-15
Payer: MEDICARE

## 2020-01-15 VITALS
HEIGHT: 64 IN | OXYGEN SATURATION: 98 % | TEMPERATURE: 98.2 F | DIASTOLIC BLOOD PRESSURE: 68 MMHG | HEART RATE: 79 BPM | WEIGHT: 195 LBS | BODY MASS INDEX: 33.29 KG/M2 | SYSTOLIC BLOOD PRESSURE: 120 MMHG

## 2020-01-15 PROCEDURE — G8427 DOCREV CUR MEDS BY ELIG CLIN: HCPCS | Performed by: PHYSICIAN ASSISTANT

## 2020-01-15 PROCEDURE — 99213 OFFICE O/P EST LOW 20 MIN: CPT | Performed by: PHYSICIAN ASSISTANT

## 2020-01-15 PROCEDURE — G8482 FLU IMMUNIZE ORDER/ADMIN: HCPCS | Performed by: PHYSICIAN ASSISTANT

## 2020-01-15 PROCEDURE — 1036F TOBACCO NON-USER: CPT | Performed by: PHYSICIAN ASSISTANT

## 2020-01-15 PROCEDURE — G8417 CALC BMI ABV UP PARAM F/U: HCPCS | Performed by: PHYSICIAN ASSISTANT

## 2020-01-15 ASSESSMENT — ENCOUNTER SYMPTOMS: COLOR CHANGE: 0

## 2020-01-15 NOTE — PROGRESS NOTES
Visit Information    Have you changed or started any medications since your last visit including any over-the-counter medicines, vitamins, or herbal medicines? no   Have you stopped taking any of your medications? Is so, why? -  no  Are you having any side effects from any of your medications? - no    Have you seen any other physician or provider since your last visit?  no   Have you had any other diagnostic tests since your last visit?  no   Have you been seen in the emergency room and/or had an admission in a hospital since we last saw you?  no   Have you had your routine dental cleaning in the past 6 months?  no     Do you have an active MyChart account? If no, what is the barrier?   Yes    Patient Care Team:  Aidee Mendoza PA-C as PCP - General (Family Medicine)  Aidee Mendoza PA-C as PCP - [facility]    Medical History Review  Past Medical, Family, and Social History reviewed and does contribute to the patient presenting condition    Health Maintenance   Topic Date Due    Varicella Vaccine (1 of 2 - 2-dose childhood series) 09/28/1991    Cervical cancer screen  10/07/2019    DTaP/Tdap/Td vaccine (2 - Td) 07/24/2028    Flu vaccine  Completed    HIV screen  Completed    Pneumococcal 0-64 years Vaccine  Aged Out

## 2020-06-24 ENCOUNTER — APPOINTMENT (OUTPATIENT)
Dept: GENERAL RADIOLOGY | Age: 30
End: 2020-06-24
Payer: MEDICARE

## 2020-06-24 ENCOUNTER — HOSPITAL ENCOUNTER (EMERGENCY)
Age: 30
Discharge: HOME | End: 2020-06-24
Attending: EMERGENCY MEDICINE
Payer: MEDICARE

## 2020-06-24 VITALS
DIASTOLIC BLOOD PRESSURE: 71 MMHG | WEIGHT: 196 LBS | RESPIRATION RATE: 18 BRPM | BODY MASS INDEX: 33.46 KG/M2 | OXYGEN SATURATION: 98 % | TEMPERATURE: 98.2 F | HEART RATE: 95 BPM | HEIGHT: 64 IN | SYSTOLIC BLOOD PRESSURE: 107 MMHG

## 2020-06-24 PROCEDURE — 99283 EMERGENCY DEPT VISIT LOW MDM: CPT

## 2020-06-24 PROCEDURE — 12001 RPR S/N/AX/GEN/TRNK 2.5CM/<: CPT

## 2020-06-24 PROCEDURE — 2500000003 HC RX 250 WO HCPCS: Performed by: EMERGENCY MEDICINE

## 2020-06-24 PROCEDURE — 73130 X-RAY EXAM OF HAND: CPT

## 2020-06-24 RX ORDER — LIDOCAINE HYDROCHLORIDE 10 MG/ML
10 INJECTION, SOLUTION INFILTRATION; PERINEURAL ONCE
Status: COMPLETED | OUTPATIENT
Start: 2020-06-24 | End: 2020-06-24

## 2020-06-24 RX ADMIN — LIDOCAINE HYDROCHLORIDE 10 ML: 10 INJECTION, SOLUTION INFILTRATION; PERINEURAL at 16:24

## 2020-06-24 ASSESSMENT — ENCOUNTER SYMPTOMS
ABDOMINAL PAIN: 0
COUGH: 0
SHORTNESS OF BREATH: 0
EYE REDNESS: 0
COLOR CHANGE: 0
DIARRHEA: 0
VOMITING: 0
FACIAL SWELLING: 0
EYE DISCHARGE: 0
CONSTIPATION: 0

## 2020-06-24 ASSESSMENT — PAIN DESCRIPTION - LOCATION: LOCATION: HAND

## 2020-06-24 ASSESSMENT — PAIN SCALES - GENERAL
PAINLEVEL_OUTOF10: 4
PAINLEVEL_OUTOF10: 4

## 2020-06-24 ASSESSMENT — PAIN DESCRIPTION - ORIENTATION: ORIENTATION: RIGHT

## 2020-06-24 NOTE — ED PROVIDER NOTES
erythema or rash. Neurological:      Mental Status: She is alert and oriented to person, place, and time. Psychiatric:         Behavior: Behavior normal.             DIAGNOSTIC RESULTS     EKG: All EKG's are interpreted by the Emergency Department Physician who either signs or Co-signs this chart in the absence of a cardiologist.    Not indicated    RADIOLOGY:   Non-plain film images such as CT, Ultrasound and MRI are read by the radiologist. Plain radiographic images are visualized and preliminarily interpreted by the emergency physician with the below findings:    X-ray of the hand on my interpretation shows no foreign body    Interpretation per the Radiologist below, if available at the time of this note:        LABS:  Labs Reviewed - No data to display    All other labs were within normal range or not returned as of this dictation. EMERGENCY DEPARTMENT COURSE and DIFFERENTIAL DIAGNOSIS/MDM:   Vitals:    Vitals:    06/24/20 1558   BP: 107/71   Pulse: 95   Resp: 18   Temp: 98.2 °F (36.8 °C)   TempSrc: Oral   SpO2: 98%   Weight: 196 lb (88.9 kg)   Height: 5' 4\" (1.626 m)       Orders Placed This Encounter   Medications    lidocaine 1 % injection 10 mL       Medical Decision Making: Sutures are to be removed in a week. Tetanus status is already up-to-date. Treatment diagnosis and follow-up were discussed with the patient. CONSULTS:  None    PROCEDURES:    The following procedure was performed by me. Local infiltration was carried out with 1% lidocaine without epinephrine resulting in complete skin anesthesia. The area was prepped with Betadine x3 and draped sterilely. It was explored for foreign bodies and none were found and the wound was closed with two 5-0 Ethilon sutures resulting in good skin reapproximation and no complications. Hemostasis achieved and she tolerated the procedure quite well. FINAL IMPRESSION      1.  Laceration of right hand without foreign body, initial encounter

## 2020-08-17 ENCOUNTER — OFFICE VISIT (OUTPATIENT)
Dept: FAMILY MEDICINE CLINIC | Age: 30
End: 2020-08-17
Payer: MEDICARE

## 2020-08-17 VITALS
OXYGEN SATURATION: 98 % | DIASTOLIC BLOOD PRESSURE: 84 MMHG | TEMPERATURE: 97.1 F | WEIGHT: 199 LBS | BODY MASS INDEX: 33.97 KG/M2 | HEART RATE: 100 BPM | HEIGHT: 64 IN | SYSTOLIC BLOOD PRESSURE: 118 MMHG

## 2020-08-17 PROCEDURE — 1036F TOBACCO NON-USER: CPT | Performed by: PHYSICIAN ASSISTANT

## 2020-08-17 PROCEDURE — G8417 CALC BMI ABV UP PARAM F/U: HCPCS | Performed by: PHYSICIAN ASSISTANT

## 2020-08-17 PROCEDURE — G8427 DOCREV CUR MEDS BY ELIG CLIN: HCPCS | Performed by: PHYSICIAN ASSISTANT

## 2020-08-17 PROCEDURE — 99214 OFFICE O/P EST MOD 30 MIN: CPT | Performed by: PHYSICIAN ASSISTANT

## 2020-08-17 RX ORDER — ADAPALENE AND BENZOYL PEROXIDE .1; 2.5 G/100G; G/100G
GEL TOPICAL
Qty: 1 TUBE | Refills: 0 | Status: SHIPPED | OUTPATIENT
Start: 2020-08-17 | End: 2020-12-27 | Stop reason: ALTCHOICE

## 2020-08-17 ASSESSMENT — PATIENT HEALTH QUESTIONNAIRE - PHQ9
2. FEELING DOWN, DEPRESSED OR HOPELESS: 0
SUM OF ALL RESPONSES TO PHQ QUESTIONS 1-9: 0
SUM OF ALL RESPONSES TO PHQ QUESTIONS 1-9: 0
1. LITTLE INTEREST OR PLEASURE IN DOING THINGS: 0
SUM OF ALL RESPONSES TO PHQ9 QUESTIONS 1 & 2: 0

## 2020-08-17 ASSESSMENT — ENCOUNTER SYMPTOMS
BACK PAIN: 0
COLOR CHANGE: 0

## 2020-08-17 NOTE — PROGRESS NOTES
Elastic Bandages & Supports (WRIST SPLINT) MISC CTS bilaterally 2 each 0    clonazePAM (KLONOPIN PO) Take by mouth as needed      OXcarbazepine (TRILEPTAL) 150 MG tablet take 1 tablet by mouth twice a day 60 tablet 1     No current facility-administered medications for this visit. Allergies   Allergen Reactions    Latex     Claritin [Loratadine]     Pollen Extract        Health Maintenance   Topic Date Due    Varicella vaccine (1 of 2 - 2-dose childhood series) 09/28/1991    Cervical cancer screen  10/07/2019    Flu vaccine (1) 09/01/2020    DTaP/Tdap/Td vaccine (2 - Td) 07/24/2028    HIV screen  Completed    Hepatitis A vaccine  Aged Out    Hepatitis B vaccine  Aged Out    Hib vaccine  Aged Out    Meningococcal (ACWY) vaccine  Aged Out    Pneumococcal 0-64 years Vaccine  Aged Out       Subjective:     Review of Systems   Constitutional: Negative for activity change, appetite change, fatigue, fever and unexpected weight change. HENT: Positive for ear pain. Negative for ear discharge. Musculoskeletal: Positive for arthralgias. Negative for back pain and gait problem. Skin: Positive for rash. Negative for color change, pallor and wound. Neurological: Negative for weakness and numbness. Hematological: Negative for adenopathy. Psychiatric/Behavioral: Negative for sleep disturbance. The patient is not nervous/anxious. Objective:     Physical Exam  Vitals signs and nursing note reviewed. Constitutional:       General: She is not in acute distress. Appearance: Normal appearance. She is not ill-appearing. HENT:      Head: Normocephalic and atraumatic. Right Ear: Tympanic membrane, ear canal and external ear normal. There is no impacted cerumen. Left Ear: Tympanic membrane, ear canal and external ear normal. There is no impacted cerumen.    Musculoskeletal:      Right wrist: She exhibits normal range of motion (positive tinels bilatearlly), no tenderness and no bony instructions. Discussed use, benefit, and side effects of prescribed medications. All patientquestions answered. Pt voiced understanding. Reviewed health maintenance. Instructedto continue current medications, diet and exercise. Patient agreed with treatmentplan. Follow up as directed.      Electronically signed by Allegra Godinez PA-C on 8/17/2020 at 12:31 PM

## 2020-08-17 NOTE — PROGRESS NOTES
Visit Information    Have you changed or started any medications since your last visit including any over-the-counter medicines, vitamins, or herbal medicines? no   Are you having any side effects from any of your medications? -  no  Have you stopped taking any of your medications? Is so, why? -  no    Have you seen any other physician or provider since your last visit? No  Have you had any other diagnostic tests since your last visit? No  Have you been seen in the emergency room and/or had an admission to a hospital since we last saw you? No  Have you had your routine dental cleaning in the past 6 months? no    Have you activated your [employer] account? If not, what are your barriers?  Yes     Patient Care Team:  Kathy Olivera PA-C as PCP - General (Family Medicine)  Kathy Olivera PA-C as PCP - [facility]    Medical History Review  Past Medical, Family, and Social History reviewed and does contribute to the patient presenting condition    Health Maintenance   Topic Date Due    Varicella vaccine (1 of 2 - 2-dose childhood series) 09/28/1991    Cervical cancer screen  10/07/2019    Flu vaccine (1) 09/01/2020    DTaP/Tdap/Td vaccine (2 - Td) 07/24/2028    HIV screen  Completed    Hepatitis A vaccine  Aged Out    Hepatitis B vaccine  Aged Out    Hib vaccine  Aged Out    Meningococcal (ACWY) vaccine  Aged Out    Pneumococcal 0-64 years Vaccine  Aged Out

## 2020-08-21 ENCOUNTER — TELEPHONE (OUTPATIENT)
Dept: FAMILY MEDICINE CLINIC | Age: 30
End: 2020-08-21

## 2020-08-21 RX ORDER — BENZOYL PEROXIDE 2.5 G/100G
GEL TOPICAL
Qty: 1 TUBE | Refills: 0 | Status: SHIPPED | OUTPATIENT
Start: 2020-08-21 | End: 2020-12-27 | Stop reason: ALTCHOICE

## 2020-12-11 ENCOUNTER — TELEPHONE (OUTPATIENT)
Dept: FAMILY MEDICINE CLINIC | Age: 30
End: 2020-12-11

## 2020-12-11 NOTE — TELEPHONE ENCOUNTER
Patient calling asking if you can send an rx over for emergency plan B she states it is expensive over the counter and she has a coupon but it has to be an rx

## 2020-12-27 ENCOUNTER — NURSE TRIAGE (OUTPATIENT)
Dept: OTHER | Facility: CLINIC | Age: 30
End: 2020-12-27

## 2020-12-27 ENCOUNTER — HOSPITAL ENCOUNTER (EMERGENCY)
Age: 30
Discharge: HOME | End: 2020-12-27
Attending: EMERGENCY MEDICINE
Payer: MEDICARE

## 2020-12-27 ENCOUNTER — APPOINTMENT (OUTPATIENT)
Dept: GENERAL RADIOLOGY | Age: 30
End: 2020-12-27
Payer: MEDICARE

## 2020-12-27 VITALS
WEIGHT: 195 LBS | OXYGEN SATURATION: 100 % | SYSTOLIC BLOOD PRESSURE: 125 MMHG | BODY MASS INDEX: 33.29 KG/M2 | HEIGHT: 64 IN | DIASTOLIC BLOOD PRESSURE: 74 MMHG | RESPIRATION RATE: 20 BRPM | TEMPERATURE: 98.4 F | HEART RATE: 112 BPM

## 2020-12-27 LAB
ABSOLUTE EOS #: <0.03 K/UL (ref 0–0.44)
ABSOLUTE IMMATURE GRANULOCYTE: 0.05 K/UL (ref 0–0.3)
ABSOLUTE LYMPH #: 1.2 K/UL (ref 1.1–3.7)
ABSOLUTE MONO #: 0.74 K/UL (ref 0.1–1.2)
AMPHETAMINE SCREEN URINE: NEGATIVE
ANION GAP SERPL CALCULATED.3IONS-SCNC: 15 MMOL/L (ref 9–17)
BARBITURATE SCREEN URINE: NEGATIVE
BASOPHILS # BLD: 0 % (ref 0–2)
BASOPHILS ABSOLUTE: 0.03 K/UL (ref 0–0.2)
BENZODIAZEPINE SCREEN, URINE: NEGATIVE
BUN BLDV-MCNC: 12 MG/DL (ref 6–20)
BUN/CREAT BLD: 15 (ref 9–20)
BUPRENORPHINE URINE: NORMAL
CALCIUM SERPL-MCNC: 9.7 MG/DL (ref 8.6–10.4)
CANNABINOID SCREEN URINE: NEGATIVE
CHLORIDE BLD-SCNC: 102 MMOL/L (ref 98–107)
CHP ED QC CHECK: YES
CO2: 24 MMOL/L (ref 20–31)
COCAINE METABOLITE, URINE: NEGATIVE
CREAT SERPL-MCNC: 0.8 MG/DL (ref 0.5–0.9)
D-DIMER QUANTITATIVE: 0.3 MG/L FEU (ref 0–0.59)
DIFFERENTIAL TYPE: ABNORMAL
EOSINOPHILS RELATIVE PERCENT: 0 % (ref 1–4)
GFR AFRICAN AMERICAN: >60 ML/MIN
GFR NON-AFRICAN AMERICAN: >60 ML/MIN
GFR SERPL CREATININE-BSD FRML MDRD: ABNORMAL ML/MIN/{1.73_M2}
GFR SERPL CREATININE-BSD FRML MDRD: ABNORMAL ML/MIN/{1.73_M2}
GLUCOSE BLD-MCNC: 101 MG/DL (ref 70–99)
HCT VFR BLD CALC: 45.3 % (ref 36.3–47.1)
HEMOGLOBIN: 14.1 G/DL (ref 11.9–15.1)
IMMATURE GRANULOCYTES: 1 %
LYMPHOCYTES # BLD: 16 % (ref 24–43)
MCH RBC QN AUTO: 27.1 PG (ref 25.2–33.5)
MCHC RBC AUTO-ENTMCNC: 31.1 G/DL (ref 28.4–34.8)
MCV RBC AUTO: 87.1 FL (ref 82.6–102.9)
MDMA URINE: NORMAL
METHADONE SCREEN, URINE: NEGATIVE
METHAMPHETAMINE, URINE: NORMAL
MONOCYTES # BLD: 10 % (ref 3–12)
NRBC AUTOMATED: 0 PER 100 WBC
OPIATES, URINE: NEGATIVE
OXYCODONE SCREEN URINE: NEGATIVE
PDW BLD-RTO: 13.3 % (ref 11.8–14.4)
PHENCYCLIDINE, URINE: NEGATIVE
PLATELET # BLD: 225 K/UL (ref 138–453)
PLATELET ESTIMATE: ABNORMAL
PMV BLD AUTO: 9.5 FL (ref 8.1–13.5)
POTASSIUM SERPL-SCNC: 3.6 MMOL/L (ref 3.7–5.3)
PREGNANCY TEST URINE, POC: NORMAL
PROPOXYPHENE, URINE: NORMAL
RBC # BLD: 5.2 M/UL (ref 3.95–5.11)
RBC # BLD: ABNORMAL 10*6/UL
SEG NEUTROPHILS: 73 % (ref 36–65)
SEGMENTED NEUTROPHILS ABSOLUTE COUNT: 5.4 K/UL (ref 1.5–8.1)
SODIUM BLD-SCNC: 141 MMOL/L (ref 135–144)
TEST INFORMATION: NORMAL
THYROXINE, FREE: 0.91 NG/DL (ref 0.93–1.7)
TRICYCLIC ANTIDEPRESSANTS, UR: NORMAL
TROPONIN INTERP: NORMAL
TROPONIN T: NORMAL NG/ML
TROPONIN, HIGH SENSITIVITY: <6 NG/L (ref 0–14)
TSH SERPL DL<=0.05 MIU/L-ACNC: 2.4 MIU/L (ref 0.3–5)
WBC # BLD: 7.4 K/UL (ref 3.5–11.3)
WBC # BLD: ABNORMAL 10*3/UL

## 2020-12-27 PROCEDURE — 85379 FIBRIN DEGRADATION QUANT: CPT

## 2020-12-27 PROCEDURE — 99283 EMERGENCY DEPT VISIT LOW MDM: CPT

## 2020-12-27 PROCEDURE — 80307 DRUG TEST PRSMV CHEM ANLYZR: CPT

## 2020-12-27 PROCEDURE — 84439 ASSAY OF FREE THYROXINE: CPT

## 2020-12-27 PROCEDURE — 93005 ELECTROCARDIOGRAM TRACING: CPT | Performed by: EMERGENCY MEDICINE

## 2020-12-27 PROCEDURE — 85025 COMPLETE CBC W/AUTO DIFF WBC: CPT

## 2020-12-27 PROCEDURE — 84443 ASSAY THYROID STIM HORMONE: CPT

## 2020-12-27 PROCEDURE — 2580000003 HC RX 258: Performed by: EMERGENCY MEDICINE

## 2020-12-27 PROCEDURE — 80048 BASIC METABOLIC PNL TOTAL CA: CPT

## 2020-12-27 PROCEDURE — 84484 ASSAY OF TROPONIN QUANT: CPT

## 2020-12-27 PROCEDURE — 71045 X-RAY EXAM CHEST 1 VIEW: CPT

## 2020-12-27 RX ORDER — 0.9 % SODIUM CHLORIDE 0.9 %
1000 INTRAVENOUS SOLUTION INTRAVENOUS ONCE
Status: COMPLETED | OUTPATIENT
Start: 2020-12-27 | End: 2020-12-27

## 2020-12-27 RX ORDER — KETOROLAC TROMETHAMINE 30 MG/ML
30 INJECTION, SOLUTION INTRAMUSCULAR; INTRAVENOUS ONCE
Status: DISCONTINUED | OUTPATIENT
Start: 2020-12-27 | End: 2020-12-27 | Stop reason: HOSPADM

## 2020-12-27 RX ORDER — HYDROXYZINE HYDROCHLORIDE 25 MG/1
25 TABLET, FILM COATED ORAL EVERY 8 HOURS PRN
Qty: 20 TABLET | Refills: 0 | Status: SHIPPED | OUTPATIENT
Start: 2020-12-27 | End: 2021-01-06

## 2020-12-27 RX ADMIN — SODIUM CHLORIDE 1000 ML: 9 INJECTION, SOLUTION INTRAVENOUS at 16:12

## 2020-12-27 RX ADMIN — SODIUM CHLORIDE 1000 ML: 9 INJECTION, SOLUTION INTRAVENOUS at 14:20

## 2020-12-27 ASSESSMENT — ENCOUNTER SYMPTOMS
FACIAL SWELLING: 0
EYE PAIN: 0
ABDOMINAL DISTENTION: 0
ABDOMINAL PAIN: 0
EYE DISCHARGE: 0
CHEST TIGHTNESS: 1
BACK PAIN: 0
SHORTNESS OF BREATH: 1

## 2020-12-27 ASSESSMENT — PAIN SCALES - GENERAL: PAINLEVEL_OUTOF10: 0

## 2020-12-27 ASSESSMENT — HEART SCORE: ECG: 0

## 2020-12-27 NOTE — ED PROVIDER NOTES
EMERGENCY DEPARTMENT ENCOUNTER    Pt Name: Babs Martinez  MRN: [id number]  [place] 1990  Date of evaluation: 12/27/20  CHIEF COMPLAINT       Chief Complaint   Patient presents with    Chest Pain     tightness    Shortness of Breath     HISTORY OF PRESENT ILLNESS   HPI  Patient is a 72-year-old female with a history of anxiety who presented to the emergency room with a 4-day history of chest pain or shortness of breath. Patient 4 days ago she had sudden onset of chest pain shortness of breath. She attributed to her anxiety for which she takes Klonopin however she takes a regular secondary for age is not making her sleepy. Over last several days she has noted inability to take a deep breath diaphoresis as well as shortness of breath. She is not on birth control pills but has a previous history of PVCs diagnosed years ago, she is on any medications. She initially thought symptoms were secondary to PVCs. Patient denied family history of clotting disorder no recent travel or immobility. Patient denied a previous history of thyroid disorder. She denied tobacco use birth control use or vaping. Patient no hemoptysis. Patient denied fevers or chills, nausea or vomiting. She does have a history of anxiety, she denied suicidal or homicidal ideations. She denied auditory or visual hallucinations. REVIEW OF SYSTEMS     Review of Systems   Constitutional: Negative for chills, diaphoresis and fever. HENT: Negative for congestion, ear pain and facial swelling. Eyes: Negative for pain, discharge and visual disturbance. Respiratory: Positive for chest tightness and shortness of breath. Cardiovascular: Negative for chest pain and palpitations. Gastrointestinal: Negative for abdominal distention and abdominal pain. Genitourinary: Negative for difficulty urinating and flank pain. Musculoskeletal: Negative for back pain. Skin: Negative for wound.    Neurological: Negative for dizziness, light-headedness and headaches. Psychiatric/Behavioral: The patient is nervous/anxious. PASTMEDICAL HISTORY     Past Medical History:   Diagnosis Date    Anxiety     Borderline personality disorder ([address]     Depression     Dysphagia     Latex allergy     Psoriasis      SURGICAL HISTORY     History reviewed. No pertinent surgical history. CURRENT MEDICATIONS       Previous Medications    CLONAZEPAM (KLONOPIN PO)    Take by mouth as needed    OXCARBAZEPINE (TRILEPTAL) 150 MG TABLET    take 1 tablet by mouth twice a day     ALLERGIES     is allergic to latex; claritin [loratadine]; and pollen extract. FAMILY HISTORY     She indicated that her mother is alive. She indicated that her father is alive. SOCIAL HISTORY       Social History     Tobacco Use    Smoking status: Never Smoker    Smokeless tobacco: Never Used   Substance Use Topics    Alcohol use: Yes     Comment: once a year    Drug use: No     PHYSICAL EXAM     INITIAL VITALS: /74   Pulse 112   Temp 98.4 °F (36.9 °C) (Oral)   Resp 20   Ht 5' 4\" (1.626 m)   Wt 195 lb (88.5 kg)   LMP 12/22/2020   SpO2 100%   BMI 33.47 kg/m²    Physical Exam  Vitals signs and nursing note reviewed. Constitutional:       General: She is not in acute distress. Appearance: She is well-developed. She is not diaphoretic. Comments: The patient is a well-developed female sitting in bed is anxious; patient is tearful during exam.   HENT:      Head: Normocephalic and atraumatic. Eyes:      Pupils: Pupils are equal, round, and reactive to light. Neck:      Musculoskeletal: Normal range of motion and neck supple. Cardiovascular:      Rate and Rhythm: Regular rhythm. Tachycardia present. Pulmonary:      Effort: Pulmonary effort is normal. No respiratory distress. Breath sounds: Normal breath sounds. No wheezing. Abdominal:      General: Bowel sounds are normal.      Palpations: Abdomen is soft.    Musculoskeletal: Normal range of motion. Skin:     General: Skin is warm. Capillary Refill: Capillary refill takes less than 2 seconds. Neurological:      Mental Status: She is alert and oriented to person, place, and time. MEDICAL DECISION MAKING:   Patient is a 75-year-old female who presented to the emergency department secondary to shortness of breath chest pain. EKG obtained on arrival to sinus tachycardia at a rate of 127, possible left atrial enlargement with low voltage QRS. AG September 26, 2017 sinus tachycardia rate of 118 short CT syndrome and diffuse low voltage. Orders for 1 L fluid bolus, chest x-ray, labs including troponin and D-dimer as well as thyroid studies. Patient will be reevaluated. Chest X-ray no infiltrate dissection pneumothorax troponin and D-dimer within normal limits. Patient's heart rate improved to 112, review of electronic medical record patient has baseline tachycardia. Has a heart score of 0. Patient will be discharged home with hydroxyzine given outpatient follow-up and parameters to return to the emergency department. All patient's question's and concerns were answered prior to disposition and patient and/or family expressed understanding and agreement of treatment plan. CRITICAL CARE:              NIH STROKE SCALE:            PROCEDURES:    Procedures    DIAGNOSTIC RESULTS   EKG:All EKG's are interpreted by the Emergency Department Physician who either signs or Co-signs this chart in the absence of a cardiologist.        RADIOLOGY:All plain film, CT, MRI, and formal ultrasound images (except ED bedside ultrasound) are read by the radiologist, see reports below, unless otherwisenoted in MDM or here. XR CHEST PORTABLE   Final Result   Unremarkable chest.           LABS: All lab results were reviewed by myself, and all abnormals are listed below.   Labs Reviewed   CBC WITH AUTO DIFFERENTIAL - Abnormal; Notable for the following components:       Result Value    RBC 5.20 (*) Seg Neutrophils 73 (*)     Lymphocytes 16 (*)     Eosinophils % 0 (*)     Immature Granulocytes 1 (*)     All other components within normal limits   BASIC METABOLIC PANEL W/ REFLEX TO MG FOR LOW K - Abnormal; Notable for the following components:    Glucose 101 (*)     Potassium 3.6 (*)     All other components within normal limits   T4, FREE - Abnormal; Notable for the following components:    Thyroxine, Free 0.91 (*)     All other components within normal limits   POCT URINE PREGNANCY - Normal   TROPONIN   D-DIMER, QUANTITATIVE   TSH WITHOUT REFLEX   URINE DRUG SCREEN       EMERGENCY DEPARTMENTCOURSE:         Vitals:    Vitals:    12/27/20 1339 12/27/20 1402 12/27/20 1545   BP: 125/74     Pulse: 136 122 112   Resp: 20     Temp: 98.4 °F (36.9 °C)     TempSrc: Oral     SpO2: 98% 100% 100%   Weight: 195 lb (88.5 kg)     Height: 5' 4\" (1.626 m)         The patient was given the following medications while in the emergency department:  Orders Placed This Encounter   Medications    0.9 % sodium chloride bolus    0.9 % sodium chloride bolus    ketorolac (TORADOL) injection 30 mg    hydrOXYzine (ATARAX) 25 MG tablet     Sig: Take 1 tablet by mouth every 8 hours as needed for Anxiety     Dispense:  20 tablet     Refill:  0     CONSULTS:  None    FINAL IMPRESSION      1. Atypical chest pain          DISPOSITION/PLAN   DISPOSITION        PATIENT REFERRED TO:  Leslie Armas PA-C  [address] [place] [address]  [phone]          DISCHARGE MEDICATIONS:  New Prescriptions    HYDROXYZINE (ATARAX) 25 MG TABLET    Take 1 tablet by mouth every 8 hours as needed for Osvaldo Andrea MD  Attending Emergency Physician    This note was created with the assistance of a speech-recognition program. While intending to generate a document that actually reflects the content of the visit, no guarantees can be provided that every mistake has been identified and corrected by editing.                    Héctor Pulido MD  21/73/53 [id number]

## 2020-12-27 NOTE — TELEPHONE ENCOUNTER
Reason for Disposition   MILD difficulty breathing (e.g., minimal/no SOB at rest, SOB with walking, pulse <100)    Answer Assessment - Initial Assessment Questions  1. COVID-19 DIAGNOSIS: \"Who made your Coronavirus (COVID-19) diagnosis? \" \"Was it confirmed by a positive lab test?\" If not diagnosed by a HCP, ask \"Are there lots of cases (community spread) where you live? \" (See public health department website, if unsure)      Unsure     2. COVID-19 EXPOSURE: \"Was there any known exposure to COVID before the symptoms began? \" [facility] Definition of close contact: within 6 feet (2 meters) for a total of 15 minutes or more over a 24-hour period. Unsure    3. ONSET: \"When did the COVID-19 symptoms start?\"       12/24/2020    4. WORST SYMPTOM: \"What is your worst symptom? \" (e.g., cough, fever, shortness of breath, muscle aches)  Shortness of breath- sitting and with exertion     5. COUGH: \"Do you have a cough? \" If so, ask: \"How bad is the cough? \"      Coughing every now and then    6. FEVER: \"Do you have a fever? \" If so, ask: \"What is your temperature, how was it measured, and when did it start? \"  Denies    7. RESPIRATORY STATUS: \"Describe your breathing? \" (e.g., shortness of breath, wheezing, unable to speak)     Shortness of breath - mild - with sitting and with exertion     8. BETTER-SAME-WORSE: Jagruti Ripple you getting better, staying the same or getting worse compared to yesterday? \"  If getting worse, ask, \"In what way? \"    Same since yesterday     9. HIGH RISK DISEASE: \"Do you have any chronic medical problems? \" (e.g., asthma, heart or lung disease, weak immune system, obesity, etc.)    Pt states she has PVC's, obesity    10. PREGNANCY: \"Is there any chance you are pregnant? \" \"When was your last menstrual period? \"     Denies    11. OTHER SYMPTOMS: \"Do you have any other symptoms? \"  (e.g., chills, fatigue, headache, loss of smell or taste, muscle pain, sore throat; new loss of smell or taste especially support the diagnosis of COVID-19)      Cough, shortness of breath with rest and exertion , anxiety,chest tightness    Protocols used: CORONAVIRUS (COVID-19) DIAGNOSED OR SUSPECTED-ADULT-    Pt calling with c/o COVID symptoms, shortness of breath and chest tightness. Care advice provided. Disposition is to Go to ED now or PCP triage. [facility]. See assessment above.

## 2020-12-28 LAB
EKG ATRIAL RATE: 127 BPM
EKG P AXIS: 54 DEGREES
EKG P-R INTERVAL: 122 MS
EKG Q-T INTERVAL: 326 MS
EKG QRS DURATION: 74 MS
EKG QTC CALCULATION (BAZETT): 473 MS
EKG R AXIS: 59 DEGREES
EKG T AXIS: 18 DEGREES
EKG VENTRICULAR RATE: 127 BPM

## 2020-12-28 PROCEDURE — 93010 ELECTROCARDIOGRAM REPORT: CPT | Performed by: INTERNAL MEDICINE

## 2021-01-07 ENCOUNTER — VIRTUAL VISIT (OUTPATIENT)
Dept: FAMILY MEDICINE CLINIC | Age: 31
End: 2021-01-07
Payer: MEDICARE

## 2021-01-07 DIAGNOSIS — J30.2 SEASONAL ALLERGIES: Primary | ICD-10-CM

## 2021-01-07 PROCEDURE — 99442 PR PHYS/QHP TELEPHONE EVALUATION 11-20 MIN: CPT | Performed by: PHYSICIAN ASSISTANT

## 2021-01-07 ASSESSMENT — ENCOUNTER SYMPTOMS
RHINORRHEA: 1
COUGH: 0
SINUS PRESSURE: 0
SHORTNESS OF BREATH: 0
SORE THROAT: 0
WHEEZING: 0
SINUS PAIN: 0

## 2021-01-07 NOTE — PROGRESS NOTES
Visit Information    Have you changed or started any medications since your last visit including any over-the-counter medicines, vitamins, or herbal medicines? no   Are you having any side effects from any of your medications? -  no  Have you stopped taking any of your medications? Is so, why? -  no    Have you seen any other physician or provider since your last visit? No  Have you had any other diagnostic tests since your last visit? No  Have you been seen in the emergency room and/or had an admission to a hospital since we last saw you? No  Have you had your routine dental cleaning in the past 6 months? no    Have you activated your oohilove account? If not, what are your barriers?  No:      Patient Care Team:  María Orozco PA-C as PCP - General (Family Medicine)  María Orozco PA-C as PCP - [facility] Provider    Medical History Review  Past Medical, Family, and Social History reviewed and does contribute to the patient presenting condition    Health Maintenance   Topic Date Due    Hepatitis C screen  1990    Varicella vaccine (1 of 2 - 2-dose childhood series) 09/28/1991    Cervical cancer screen  10/07/2019    DTaP/Tdap/Td vaccine (2 - Td) 07/24/2028    Flu vaccine  Completed    HIV screen  Completed    Hepatitis A vaccine  Aged Out    Hepatitis B vaccine  Aged Out    Hib vaccine  Aged Out    Meningococcal (ACWY) vaccine  Aged Out    Pneumococcal 0-64 years Vaccine  Aged Out

## 2021-01-07 NOTE — PROGRESS NOTES
[facility]  [address]  [address] [address] [address] [address]  Dept: [phone]  Dept Fax: [phone]    Braxton Salazar is a 27 y.o. female who presents today for her medical conditions/complaintsas noted below. Braxton Salazar is c/o of   Chief Complaint   Patient presents with   [address] [facility] [address]     Wants to get allergy testing, last couple of years shes been struggling with allergies, doesnt know what shes allergic too and wants to manage them now          HPI:     HPI    Patient states for the years 2019 and 2020 she has been having worsening allergy symptoms. Seems to flare worse in certain seasons worse but she states has some degree of symptoms all year. Notes sinus congestion, sneezing, tickle in throat, runny nose and occasional cough or eye itching. She tried different OTC allergy medications. Doesn't feel that they are that helpful. She does have a cat and a dog also that she is around. She has tried nasocort and antihistamines    No results found for: LABA1C          ( goal A1Cis < 7)   No results found for: LABMICR  No results found for: LDLCHOLESTEROL, LDLCALC    (goal LDL is <100)   AST (U/L)   Date Value   02/05/2017 21     ALT (U/L)   Date Value   02/05/2017 14     BUN (mg/dL)   Date Value   12/27/2020 12     BP Readings from Last 3 Encounters:   12/27/20 125/74   08/17/20 118/84   06/24/20 107/71          (goal 120/80)    Past Medical History:   Diagnosis Date    Anxiety     Borderline personality disorder ([address]     Depression     Dysphagia     Latex allergy     Psoriasis       History reviewed. No pertinent surgical history. History reviewed. No pertinent family history.     Social History     Tobacco Use    Smoking status: Never Smoker    Smokeless tobacco: Never Used   Substance Use Topics    Alcohol use: Yes     Comment: once a year      Current Outpatient Medications   Medication Sig Dispense Refill  OXcarbazepine (TRILEPTAL) 150 MG tablet take 1 tablet by mouth twice a day 60 tablet 1     No current facility-administered medications for this visit. Allergies   Allergen Reactions    Latex     Claritin [Loratadine]     Pollen Extract        Health Maintenance   Topic Date Due    Hepatitis C screen  1990    Varicella vaccine (1 of 2 - 2-dose childhood series) 09/28/1991    Cervical cancer screen  10/07/2019    DTaP/Tdap/Td vaccine (2 - Td) 07/24/2028    Flu vaccine  Completed    HIV screen  Completed    Hepatitis A vaccine  Aged Out    Hepatitis B vaccine  Aged Out    Hib vaccine  Aged Out    Meningococcal (ACWY) vaccine  Aged Out    Pneumococcal 0-64 years Vaccine  Aged Out       Subjective:     Review of Systems   Constitutional: Negative for activity change, appetite change, fatigue, fever and unexpected weight change. HENT: Positive for congestion, rhinorrhea and sneezing. Negative for postnasal drip, sinus pressure, sinus pain and sore throat. Respiratory: Negative for cough, shortness of breath and wheezing. Cardiovascular: Negative for chest pain. Skin: Negative for rash. Psychiatric/Behavioral: Negative for sleep disturbance. The patient is not nervous/anxious. Objective:     Physical Exam  Nursing note reviewed. Pulmonary:      Comments: Talkative, pleasant, nonlabored breathing, no audible coughing. Speaking in full sentences  Neurological:      Mental Status: She is alert and oriented to person, place, and time. Psychiatric:         Mood and Affect: Mood normal.         Behavior: Behavior normal.         Thought Content: Thought content normal.         Judgment: Judgment normal.       [facility] 12/22/2020     Assessment:       Diagnosis Orders   1. Seasonal allergies  Allergen, Respiratory, Region 5 Panel    Respiratory allergen profile             Plan:      Return if symptoms worsen or fail to improve.     Will check allergy panel, respiratory and regional

## 2021-03-30 ENCOUNTER — OFFICE VISIT (OUTPATIENT)
Dept: FAMILY MEDICINE CLINIC | Age: 31
End: 2021-03-30
Payer: MEDICARE

## 2021-03-30 VITALS
HEART RATE: 97 BPM | WEIGHT: 192 LBS | SYSTOLIC BLOOD PRESSURE: 124 MMHG | BODY MASS INDEX: 32.78 KG/M2 | OXYGEN SATURATION: 99 % | DIASTOLIC BLOOD PRESSURE: 82 MMHG | HEIGHT: 64 IN | TEMPERATURE: 97 F

## 2021-03-30 DIAGNOSIS — F41.9 ANXIETY: ICD-10-CM

## 2021-03-30 DIAGNOSIS — H61.23 BILATERAL IMPACTED CERUMEN: Primary | ICD-10-CM

## 2021-03-30 DIAGNOSIS — S46.912A MUSCLE STRAIN OF LEFT SHOULDER, INITIAL ENCOUNTER: ICD-10-CM

## 2021-03-30 DIAGNOSIS — Z30.09 BIRTH CONTROL COUNSELING: ICD-10-CM

## 2021-03-30 PROCEDURE — G8484 FLU IMMUNIZE NO ADMIN: HCPCS | Performed by: PHYSICIAN ASSISTANT

## 2021-03-30 PROCEDURE — 69210 REMOVE IMPACTED EAR WAX UNI: CPT | Performed by: PHYSICIAN ASSISTANT

## 2021-03-30 PROCEDURE — G8427 DOCREV CUR MEDS BY ELIG CLIN: HCPCS | Performed by: PHYSICIAN ASSISTANT

## 2021-03-30 PROCEDURE — 99214 OFFICE O/P EST MOD 30 MIN: CPT | Performed by: PHYSICIAN ASSISTANT

## 2021-03-30 PROCEDURE — G8417 CALC BMI ABV UP PARAM F/U: HCPCS | Performed by: PHYSICIAN ASSISTANT

## 2021-03-30 PROCEDURE — 1036F TOBACCO NON-USER: CPT | Performed by: PHYSICIAN ASSISTANT

## 2021-03-30 RX ORDER — CLONAZEPAM 0.25 MG/1
0.25 TABLET, ORALLY DISINTEGRATING ORAL NIGHTLY PRN
Qty: 10 TABLET | Refills: 0 | Status: SHIPPED | OUTPATIENT
Start: 2021-03-30 | End: 2022-08-10

## 2021-03-30 RX ORDER — CLONAZEPAM 0.25 MG/1
0.25 TABLET, ORALLY DISINTEGRATING ORAL NIGHTLY PRN
COMMUNITY
End: 2021-03-30 | Stop reason: SDUPTHER

## 2021-03-30 RX ORDER — HYDROXYZINE HYDROCHLORIDE 10 MG/1
10 TABLET, FILM COATED ORAL EVERY 8 HOURS PRN
COMMUNITY
End: 2022-03-24

## 2021-03-30 ASSESSMENT — ENCOUNTER SYMPTOMS
COUGH: 0
RHINORRHEA: 0
SORE THROAT: 0
COLOR CHANGE: 0

## 2021-03-30 NOTE — PATIENT INSTRUCTIONS
Patient Education        Shoulder Stretches: Exercises  Introduction  Here are some examples of exercises for you to try. The exercises may be suggested for a condition or for rehabilitation. Start each exercise slowly. Ease off the exercises if you start to have pain. You will be told when to start these exercises and which ones will work best for you. How to do the exercises  Shoulder stretch   1.  a doorway and place one arm against the door frame. Your elbow should be a little higher than your shoulder. 2. Relax your shoulders as you lean forward, allowing your chest and shoulder muscles to stretch. You can also turn your body slightly away from your arm to stretch the muscles even more. 3. Hold for 15 to 30 seconds. 4. Repeat 2 to 4 times with each arm. Shoulder and chest stretch   1. Shoulder and chest stretch  2. While sitting, relax your upper body so you slump slightly in your chair. 3. As you breathe in, straighten your back and open your arms out to the sides. 4. Gently pull your shoulder blades back and downward. 5. Hold for 15 to 30 seconds as your breathe normally. 6. Repeat 2 to 4 times. Overhead stretch   1. Reach up over your head with both arms. 2. Hold for 15 to 30 seconds. 3. Repeat 2 to 4 times. Follow-up care is a key part of your treatment and safety. Be sure to make and go to all appointments, and call your doctor if you are having problems. It's also a good idea to know your test results and keep a list of the medicines you take. Where can you learn more? Go to https://silva.ZipZap. org and sign in to your [employer] account. Enter S254 in the Meshify box to learn more about \"Shoulder Stretches: Exercises. \"     If you do not have an account, please click on the \"Sign Up Now\" link. Current as of: November 16, 2020               Content Version: 12.8  © 4977-9071 Healthwise, Incorporated.    Care instructions adapted under license by [facility] ([facility]). If you have questions about a medical condition or this instruction, always ask your healthcare professional. [place] [address] any warranty or liability for your use of this information.

## 2021-03-30 NOTE — PROGRESS NOTES
Visit Information    Have you changed or started any medications since your last visit including any over-the-counter medicines, vitamins, or herbal medicines? no   Are you having any side effects from any of your medications? -  no  Have you stopped taking any of your medications? Is so, why? -  no    Have you seen any other physician or provider since your last visit? No  Have you had any other diagnostic tests since your last visit? No  Have you been seen in the emergency room and/or had an admission to a hospital since we last saw you? No  Have you had your routine dental cleaning in the past 6 months? no    Have you activated your [employer] account? If not, what are your barriers?  Yes     Patient Care Team:  Juan A Charles PA-C as PCP - General (Family Medicine)  Juan A Charles PA-C as PCP - [facility]    Medical History Review  Past Medical, Family, and Social History reviewed and  contribute to the patient presenting condition    Health Maintenance   Topic Date Due    Hepatitis C screen  Never done    Varicella vaccine (1 of 2 - 2-dose childhood series) Never done    COVID-19 Vaccine (1) Never done    Cervical cancer screen  10/07/2019    DTaP/Tdap/Td vaccine (2 - Td) 07/24/2028    Flu vaccine  Completed    HIV screen  Completed    Hepatitis A vaccine  Aged Out    Hepatitis B vaccine  Aged Out    Hib vaccine  Aged Out    Meningococcal (ACWY) vaccine  Aged Out    Pneumococcal 0-64 years Vaccine  Aged Out

## 2021-03-30 NOTE — PROGRESS NOTES
[address] [facility]  [address]  [address] [facility] [address]  Dept: [phone]  Dept Fax: [phone]    Ian Hairston is a 27 y.o. female who presents today for her medical conditions/complaintsas noted below. Ian Hairston is c/o of   Chief Complaint   Patient presents with    Ear Fullness     pt is flying on Friday    Anxiety     with flying. in between psychiatrists, needs refill on klonopin, uses very rarely         HPI:     Ear Fullness   There is pain in both ears. This is a recurrent problem. The current episode started 1 to 4 weeks ago. The problem occurs constantly. Progression since onset: a little improvement with peroxide on the right. There has been no fever. The patient is experiencing no pain. Associated symptoms include hearing loss. Pertinent negatives include no coughing, ear discharge, rash, rhinorrhea or sore throat. Treatments tried: peroxide flush at home. The treatment provided mild relief. patient states she is not presently seeing her psychiatrist. She is needing a refill on klonopin, uses just when she travels. She is flying out to see her grandparents after not seeing them the last year due to covid. She is really excited for this, leaving later this week. Also requesting ears flushed before the flight    She also reports for the last few years getting shoulder muscle pain since a bad spasm after waking up years ago. She is interested in hand out on prn home stretches she could try. Not too bad today but fluctuates. No new trauma    Patient also interested in discussing birth control options. She is not presently sexually active but thinks she may be in the next year or so. She has used birth control pills in the past but would like to discuss all options today. She states her menses does very in how regular it is. Number of days she bleeds does stay consistent but can be 3-5 weeks apart.  She would like things to be more regular or not at all. No results found for: LABA1C          ( goal A1Cis < 7)   No results found for: LABMICR  No results found for: LDLCHOLESTEROL, LDLCALC    (goal LDL is <100)   AST (U/L)   Date Value   02/05/2017 21     ALT (U/L)   Date Value   02/05/2017 14     BUN (mg/dL)   Date Value   12/27/2020 12     BP Readings from Last 3 Encounters:   03/30/21 124/82   12/27/20 125/74   08/17/20 118/84          (goal 120/80)    Past Medical History:   Diagnosis Date    Anxiety     Borderline personality disorder ([facility] [address]     Depression     Dysphagia     Latex allergy     Psoriasis       History reviewed. No pertinent surgical history. History reviewed. No pertinent family history. Social History     Tobacco Use    Smoking status: Never Smoker    Smokeless tobacco: Never Used   Substance Use Topics    Alcohol use: Yes     Comment: once a year      Current Outpatient Medications   Medication Sig Dispense Refill    clonazePAM (KLONOPIN) 0.25 MG disintegrating tablet Take 1 tablet by mouth nightly as needed for Anxiety for up to 10 days. 10 tablet 0    OXcarbazepine (TRILEPTAL) 150 MG tablet take 1 tablet by mouth twice a day 60 tablet 1    hydrOXYzine (ATARAX) 10 MG tablet Take 10 mg by mouth every 8 hours as needed for Itching       No current facility-administered medications for this visit.       Allergies   Allergen Reactions    Latex     Claritin [Loratadine]     Pollen Extract        Health Maintenance   Topic Date Due    Hepatitis C screen  Never done    Varicella vaccine (1 of 2 - 2-dose childhood series) Never done    COVID-19 Vaccine (1) Never done    Cervical cancer screen  10/07/2019    DTaP/Tdap/Td vaccine (2 - Td) 07/24/2028    Flu vaccine  Completed    HIV screen  Completed    Hepatitis A vaccine  Aged Out    Hepatitis B vaccine  Aged Out    Hib vaccine  Aged Out    Meningococcal (ACWY) vaccine  Aged Out    Pneumococcal 0-64 years Vaccine  Aged Out       Subjective: Assessment:       Diagnosis Orders   1. Bilateral impacted cerumen  NM REMOVAL IMPACTED CERUMEN INSTRUMENTATION UNILAT   2. Anxiety  clonazePAM (KLONOPIN) 0.25 MG disintegrating tablet   3. Muscle strain of left shoulder, initial encounter     4. Birth control counseling               Plan:      Return if symptoms worsen or fail to improve. Patient tolerated ear flushing well. Prn debrox OTC  Anxiety with flight. Refill klonopin #10. Advised her to follow up with psychiatrist for long term management of medication  Controlled Substance Monitoring: reviewed    Acute and Chronic Pain Monitoring:   No flowsheet data found. Hand out on home shoulder muscle stretches to try given. We discussed chiropractic vs PT for further treatment. Also recommended she try getting massages    Patient and I spent time discussing different birth control options including oral, topical, nuva ring, and implanted options. She is most interested in arm implanon options. Advised would need a negative pregnancy test first as well as seeing GYN to start treatment. Patient wants to think about this for the present time and will get back with me or see GYN to discuss further  Patient agreed with treatment plan    >30min spent with patient today discussing the above questions. All questions answered to the best of my ability. Patient was satisfied with discussion at the time    Orders Placed This Encounter   Medications    clonazePAM (KLONOPIN) 0.25 MG disintegrating tablet     Sig: Take 1 tablet by mouth nightly as needed for Anxiety for up to 10 days. Dispense:  10 tablet     Refill:  0       Patient given educational materials - see patient instructions. Discussed use, benefit, and side effects of prescribed medications. All patientquestions answered. Pt voiced understanding. Reviewed health maintenance. Instructedto continue current medications, diet and exercise. Patient agreed with treatmentplan. Follow up as directed. Electronically signed by Radha Ferro PA-C on 3/30/2021 at 12:53 PM

## 2021-05-25 ENCOUNTER — OFFICE VISIT (OUTPATIENT)
Dept: FAMILY MEDICINE CLINIC | Age: 31
End: 2021-05-25
Payer: MEDICARE

## 2021-05-25 VITALS
SYSTOLIC BLOOD PRESSURE: 112 MMHG | TEMPERATURE: 97.4 F | WEIGHT: 188.98 LBS | DIASTOLIC BLOOD PRESSURE: 66 MMHG | HEIGHT: 64 IN | HEART RATE: 110 BPM | OXYGEN SATURATION: 98 % | BODY MASS INDEX: 32.26 KG/M2

## 2021-05-25 DIAGNOSIS — F41.9 ANXIETY: ICD-10-CM

## 2021-05-25 DIAGNOSIS — F84.5 ASPERGER SYNDROME: ICD-10-CM

## 2021-05-25 DIAGNOSIS — N92.6 IRREGULAR MENSES: Primary | ICD-10-CM

## 2021-05-25 DIAGNOSIS — Z30.019 ENCOUNTER FOR INITIAL PRESCRIPTION OF CONTRACEPTIVES, UNSPECIFIED CONTRACEPTIVE: ICD-10-CM

## 2021-05-25 LAB
CONTROL: POSITIVE
PREGNANCY TEST URINE, POC: NEGATIVE

## 2021-05-25 PROCEDURE — 1036F TOBACCO NON-USER: CPT | Performed by: PHYSICIAN ASSISTANT

## 2021-05-25 PROCEDURE — G8417 CALC BMI ABV UP PARAM F/U: HCPCS | Performed by: PHYSICIAN ASSISTANT

## 2021-05-25 PROCEDURE — 81025 URINE PREGNANCY TEST: CPT | Performed by: PHYSICIAN ASSISTANT

## 2021-05-25 PROCEDURE — 99213 OFFICE O/P EST LOW 20 MIN: CPT | Performed by: PHYSICIAN ASSISTANT

## 2021-05-25 PROCEDURE — G8427 DOCREV CUR MEDS BY ELIG CLIN: HCPCS | Performed by: PHYSICIAN ASSISTANT

## 2021-05-25 RX ORDER — NORETHINDRONE ACETATE AND ETHINYL ESTRADIOL 1.5-30(21)
1 KIT ORAL DAILY
Qty: 1 PACKET | Refills: 5 | Status: SHIPPED | OUTPATIENT
Start: 2021-05-25 | End: 2021-09-30

## 2021-05-25 ASSESSMENT — ENCOUNTER SYMPTOMS: COLOR CHANGE: 0

## 2021-05-25 NOTE — PROGRESS NOTES
Visit Information    Have you changed or started any medications since your last visit including any over-the-counter medicines, vitamins, or herbal medicines? no   Are you having any side effects from any of your medications? -  no  Have you stopped taking any of your medications? Is so, why? -  no    Have you seen any other physician or provider since your last visit? No  Have you had any other diagnostic tests since your last visit? No  Have you been seen in the emergency room and/or had an admission to a hospital since we last saw you? No  Have you had your routine dental cleaning in the past 6 months? no    Have you activated your [employer] account? If not, what are your barriers?  Yes     Patient Care Team:  Geraldo Gallego PA-C as PCP - General (Family Medicine)  Geraldo Gallego PA-C as PCP - [facility] Provider    Medical History Review  Past Medical, Family, and Social History reviewed and  contribute to the patient presenting condition    Health Maintenance   Topic Date Due    Hepatitis C screen  Never done    Varicella vaccine (1 of 2 - 2-dose childhood series) Never done    Cervical cancer screen  10/07/2019    DTaP/Tdap/Td vaccine (2 - Td) 07/24/2028    Flu vaccine  Completed    COVID-19 Vaccine  Completed    HIV screen  Completed    Hepatitis A vaccine  Aged Out    Hepatitis B vaccine  Aged Out    Hib vaccine  Aged Out    Meningococcal (ACWY) vaccine  Aged Out    Pneumococcal 0-64 years Vaccine  Aged Out

## 2021-05-25 NOTE — PROGRESS NOTES
[address] [facility]  [address] [place] [address]  Dept: [phone]  Dept Fax: [phone]    Lilly Valles is a 27 y.o. female who presents today for her medical conditions/complaintsas noted below. Lilly Valles is c/o of   Chief Complaint   Patient presents with    Contraception     discuss options         HPI:     HPI    Patient states menses started around age 15. She was regular for a few months then became more irregular as she got older. She used birth control pill at one point to help regulate it. She did well on this for a few years. Stopped it by her own choice but over the years she has had more irregularity. She reports now she is not able to predict it and is moderately heavy for 3 days. She would like to restart birth control at this point to help regulate it. She is sexually active, using condoms and pull out method. Patient also needing new referral to psychiatry. She is presently with [facility] but wanting to change. No results found for: LABA1C          ( goal A1Cis < 7)   No results found for: LABMICR  No results found for: LDLCHOLESTEROL, LDLCALC    (goal LDL is <100)   AST (U/L)   Date Value   02/05/2017 21     ALT (U/L)   Date Value   02/05/2017 14     BUN (mg/dL)   Date Value   12/27/2020 12     BP Readings from Last 3 Encounters:   05/25/21 112/66   03/30/21 124/82   12/27/20 125/74          (goal 120/80)    Past Medical History:   Diagnosis Date    Anxiety     Borderline personality disorder ([facility] [address]     Depression     Dysphagia     Latex allergy     Psoriasis       History reviewed. No pertinent surgical history. History reviewed. No pertinent family history.     Social History     Tobacco Use    Smoking status: Never Smoker    Smokeless tobacco: Never Used   Substance Use Topics    Alcohol use: Yes     Comment: once a year      Current Outpatient Medications   Medication Sig Dispense Refill    norethindrone-ethinyl estradiol-iron (LOESTRIN FE 1.5/30) 1.5-30 MG-MCG tablet Take 1 tablet by mouth daily 1 packet 5    hydrOXYzine (ATARAX) 10 MG tablet Take 10 mg by mouth every 8 hours as needed for Itching      OXcarbazepine (TRILEPTAL) 150 MG tablet take 1 tablet by mouth twice a day 60 tablet 1    clonazePAM (KLONOPIN) 0.25 MG disintegrating tablet Take 1 tablet by mouth nightly as needed for Anxiety for up to 10 days. 10 tablet 0     No current facility-administered medications for this visit. Allergies   Allergen Reactions    Latex     Claritin [Loratadine]     Pollen Extract        Health Maintenance   Topic Date Due    Hepatitis C screen  Never done    Varicella vaccine (1 of 2 - 2-dose childhood series) Never done    Cervical cancer screen  10/07/2019    DTaP/Tdap/Td vaccine (2 - Td) 07/24/2028    Flu vaccine  Completed    COVID-19 Vaccine  Completed    HIV screen  Completed    Hepatitis A vaccine  Aged Out    Hepatitis B vaccine  Aged Out    Hib vaccine  Aged Out    Meningococcal (ACWY) vaccine  Aged Out    Pneumococcal 0-64 years Vaccine  Aged Out       Subjective:     Review of Systems   Constitutional: Negative for activity change, appetite change, fatigue, fever and unexpected weight change. Genitourinary: Positive for menstrual problem. Negative for dysuria, pelvic pain and vaginal bleeding. Skin: Negative for color change, pallor, rash and wound. Neurological: Negative for weakness and numbness. Hematological: Negative for adenopathy. Psychiatric/Behavioral: Negative for sleep disturbance. The patient is not nervous/anxious. Objective:     Physical Exam  Vitals and nursing note reviewed. Constitutional:       General: She is not in acute distress. Appearance: Normal appearance. She is well-developed. She is not ill-appearing. HENT:      Head: Normocephalic and atraumatic. Skin:     General: Skin is warm and dry. Coloration: Skin is not pale. Findings: No erythema or rash. Neurological:      Mental Status: She is alert and oriented to person, place, and time. Motor: No weakness. Gait: Gait normal.   Psychiatric:         Mood and Affect: Mood normal.         Behavior: Behavior normal.         Thought Content: Thought content normal.         Judgment: Judgment normal.       /66 (Site: Left Upper Arm, Position: Sitting, Cuff Size: Medium Adult)   Pulse 110   Temp 97.4 °F (36.3 °C) (Tympanic)   Ht 5' 4\" (1.626 m)   Wt 188 lb 15.7 oz (85.7 kg)   SpO2 98%   BMI 32.44 kg/m²     Assessment:       Diagnosis Orders   1. Irregular menses     2. Encounter for initial prescription of contraceptives, unspecified contraceptive  POCT urine pregnancy    norethindrone-ethinyl estradiol-iron (LOESTRIN FE 1.5/30) 1.5-30 MG-MCG tablet   3. Chica Mejia MD, Psychiatry, [facility]   4. Asperger syndrome  Eben Hutchinson MD, Psychiatry, [facility]:      Return in about 3 months (around 8/25/2021) for pap in 2-3 months. Patient interested in restarting BCP  She has used this well in the past  Use and SE reviewed  Urine preg test negative  Sunday start method reviewed  Encouraged still using a condom as well  Referral to psychiatry, Dr. Ophelia Clark given  Patient agreed with treatment plan      Orders Placed This Encounter   Procedures   Mikhail Brandt MD, Psychiatry, [facility]     Referral Priority:   Routine     Referral Type:   Eval and Treat     Referral Reason:   Specialty Services Required     Referred to Provider:   Jocelyn Palmer MD     Requested Specialty:   Psychiatry     Number of Visits Requested:   1    POCT urine pregnancy     Results for orders placed or performed in visit on 05/25/21   POCT urine pregnancy   Result Value Ref Range    Preg Test, Ur negative     Control positive          Patient given educational materials - see patient instructions. Discussed use, benefit, and side effects of prescribed medications.   All patientquestions answered. Pt voiced understanding. Reviewed health maintenance. Instructedto continue current medications, diet and exercise. Patient agreed with treatmentplan. Follow up as directed.      Electronically signed by Ludivina Casas PA-C on 5/25/2021 at 8:46 AM

## 2021-06-01 ENCOUNTER — OFFICE VISIT (OUTPATIENT)
Dept: PRIMARY CARE CLINIC | Age: 31
End: 2021-06-01
Payer: MEDICARE

## 2021-06-01 VITALS
DIASTOLIC BLOOD PRESSURE: 74 MMHG | TEMPERATURE: 97.3 F | SYSTOLIC BLOOD PRESSURE: 126 MMHG | OXYGEN SATURATION: 100 % | HEART RATE: 103 BPM | HEIGHT: 64 IN | WEIGHT: 188 LBS | BODY MASS INDEX: 32.1 KG/M2

## 2021-06-01 DIAGNOSIS — B37.31 VULVAR CANDIDIASIS: Primary | ICD-10-CM

## 2021-06-01 PROCEDURE — G8417 CALC BMI ABV UP PARAM F/U: HCPCS | Performed by: FAMILY MEDICINE

## 2021-06-01 PROCEDURE — 1036F TOBACCO NON-USER: CPT | Performed by: FAMILY MEDICINE

## 2021-06-01 PROCEDURE — G8427 DOCREV CUR MEDS BY ELIG CLIN: HCPCS | Performed by: FAMILY MEDICINE

## 2021-06-01 PROCEDURE — 99213 OFFICE O/P EST LOW 20 MIN: CPT | Performed by: FAMILY MEDICINE

## 2021-06-01 RX ORDER — NYSTATIN 100000 U/G
OINTMENT TOPICAL
Qty: 1 TUBE | Refills: 0 | Status: SHIPPED | OUTPATIENT
Start: 2021-06-01 | End: 2021-09-30

## 2021-06-01 ASSESSMENT — ENCOUNTER SYMPTOMS
BACK PAIN: 0
SORE THROAT: 0
CONSTIPATION: 0
DIARRHEA: 1
NAUSEA: 0
VOMITING: 0

## 2021-06-01 NOTE — PROGRESS NOTES
[facility] [address] [facility] [place] [facility]  [address] [address]  Dept: [phone]  Dept Fax: [phone]    Alexis Wallace is a 27 y.o. female who presents today for her medical conditions/complaintsas noted below. Alexis Wallace is c/o of Vaginal Pain (pt is having vaginal pain and redness x 4  days)        HPI:     Vaginal Pain  The patient's primary symptoms include genital itching and a genital rash. The patient's pertinent negatives include no genital lesions, genital odor, missed menses, pelvic pain, vaginal bleeding or vaginal discharge. This is a recurrent problem. The current episode started in the past 7 days (4 days). The problem occurs constantly. The problem has been unchanged. The pain is mild. She is not pregnant. Associated symptoms include diarrhea and rash. Pertinent negatives include no back pain, chills, constipation, discolored urine, dysuria, fever, flank pain, frequency, hematuria, nausea, sore throat or vomiting. The symptoms are aggravated by tactile pressure. Treatments tried: vagisil, warm shower. The treatment provided no relief. She is sexually active. It is unknown whether or not her partner has an STD. She uses condoms and oral contraceptives (Just started oral contraceptives) for contraception. Her menstrual history has been irregular. There is no history of herpes simplex, an STD or vaginosis. Past Medical History:   Diagnosis Date    Anxiety     Borderline personality disorder ([address]     Depression     Dysphagia     Latex allergy     Psoriasis     Past medical history reviewed and pertinent positives/negatives in the HPI    History reviewed. No pertinent surgical history. History reviewed. No pertinent family history.     Social History     Tobacco Use    Smoking status: Never Smoker    Smokeless tobacco: Never Used   Substance Use Topics    Alcohol use: Yes     Comment: once a year      Current Outpatient Medications Medication Sig Dispense Refill    nystatin (MYCOSTATIN) 687521 UNIT/GM ointment Apply topically 2 times daily. 1 Tube 0    norethindrone-ethinyl estradiol-iron (LOESTRIN FE 1.5/30) 1.5-30 MG-MCG tablet Take 1 tablet by mouth daily 1 packet 5    hydrOXYzine (ATARAX) 10 MG tablet Take 10 mg by mouth every 8 hours as needed for Itching      clonazePAM (KLONOPIN) 0.25 MG disintegrating tablet Take 1 tablet by mouth nightly as needed for Anxiety for up to 10 days. 10 tablet 0    OXcarbazepine (TRILEPTAL) 150 MG tablet take 1 tablet by mouth twice a day 60 tablet 1     No current facility-administered medications for this visit. Allergies   Allergen Reactions    Latex     Claritin [Loratadine]     Pollen Extract        Health Maintenance   Topic Date Due    Hepatitis C screen  Never done    Varicella vaccine (1 of 2 - 2-dose childhood series) Never done    Cervical cancer screen  10/07/2019    DTaP/Tdap/Td vaccine (2 - Td) 07/24/2028    Flu vaccine  Completed    COVID-19 Vaccine  Completed    HIV screen  Completed    Hepatitis A vaccine  Aged Out    Hepatitis B vaccine  Aged Out    Hib vaccine  Aged Out    Meningococcal (ACWY) vaccine  Aged Out    Pneumococcal 0-64 years Vaccine  Aged Out       :      Review of Systems   Constitutional: Negative for chills and fever. HENT: Negative. Negative for sore throat. Gastrointestinal: Positive for diarrhea. Negative for constipation, nausea and vomiting. Genitourinary: Positive for vaginal pain. Negative for dysuria, flank pain, frequency, hematuria, missed menses, pelvic pain and vaginal discharge. Musculoskeletal: Negative for back pain and myalgias. Skin: Positive for rash. Hematological: Negative for adenopathy. Objective:     Physical Exam  Vitals and nursing note reviewed. Exam conducted with a chaperone present (Sarah Ngo). Constitutional:       Appearance: Normal appearance.    HENT:      Head: Normocephalic and

## 2021-06-01 NOTE — PATIENT INSTRUCTIONS
Patient Education        Vaginal Yeast Infection: Care Instructions  Overview     A vaginal yeast infection is the growth of too many yeast cells in the vagina. This is common in women of all ages. Itching, vaginal discharge and irritation, and other symptoms can bother you. But yeast infections don't often cause other health problems. Some medicines can increase your risk of getting a yeast infection. These include antibiotics, birth control pills, hormones, and steroids. You may also be more likely to get a yeast infection if you are pregnant, have diabetes, douche, or wear tight clothes. With treatment, most yeast infections get better in 2 to 3 days. Follow-up care is a key part of your treatment and safety. Be sure to make and go to all appointments, and call your doctor if you are having problems. It's also a good idea to know your test results and keep a list of the medicines you take. How can you care for yourself at home? · Take your medicines exactly as prescribed. Call your doctor if you think you are having a problem with your medicine. · Ask your doctor about over-the-counter (OTC) medicines for yeast infections. They may cost less than prescription medicines. If you use an OTC treatment, read and follow all instructions on the label. · Don't use tampons while using a vaginal cream or suppository. The tampons can absorb the medicine. Use pads instead. · Wear loose cotton clothing. Don't wear nylon or other fabric that holds body heat and moisture close to the skin. · Try sleeping without underwear. · Don't scratch. Relieve itching with a cold pack or a cool bath. · Don't wash your vaginal area more than once a day. Use plain water or a mild, unscented soap. Air-dry the vaginal area. · Change out of wet swimsuits after swimming. · If you are using a vaginal medicine, don't have sex until you have finished your treatment.  But if you do have sex, don't depend on a condom or diaphragm for

## 2021-08-16 ENCOUNTER — HOSPITAL ENCOUNTER (EMERGENCY)
Age: 31
Discharge: HOME | End: 2021-08-16
Attending: EMERGENCY MEDICINE
Payer: MEDICARE

## 2021-08-16 ENCOUNTER — APPOINTMENT (OUTPATIENT)
Dept: GENERAL RADIOLOGY | Age: 31
End: 2021-08-16
Payer: MEDICARE

## 2021-08-16 VITALS
SYSTOLIC BLOOD PRESSURE: 116 MMHG | HEART RATE: 82 BPM | RESPIRATION RATE: 18 BRPM | DIASTOLIC BLOOD PRESSURE: 73 MMHG | OXYGEN SATURATION: 99 % | BODY MASS INDEX: 31.41 KG/M2 | HEIGHT: 64 IN | TEMPERATURE: 98.2 F | WEIGHT: 184 LBS

## 2021-08-16 DIAGNOSIS — M54.12 CERVICAL RADICULOPATHY: ICD-10-CM

## 2021-08-16 DIAGNOSIS — M25.512 ACUTE PAIN OF LEFT SHOULDER: Primary | ICD-10-CM

## 2021-08-16 PROCEDURE — 73030 X-RAY EXAM OF SHOULDER: CPT

## 2021-08-16 PROCEDURE — 6370000000 HC RX 637 (ALT 250 FOR IP): Performed by: EMERGENCY MEDICINE

## 2021-08-16 PROCEDURE — 96372 THER/PROPH/DIAG INJ SC/IM: CPT

## 2021-08-16 PROCEDURE — 99285 EMERGENCY DEPT VISIT HI MDM: CPT

## 2021-08-16 PROCEDURE — 6360000002 HC RX W HCPCS: Performed by: EMERGENCY MEDICINE

## 2021-08-16 RX ORDER — ACETAMINOPHEN 500 MG
1000 TABLET ORAL ONCE
Status: COMPLETED | OUTPATIENT
Start: 2021-08-16 | End: 2021-08-16

## 2021-08-16 RX ORDER — KETOROLAC TROMETHAMINE 30 MG/ML
60 INJECTION, SOLUTION INTRAMUSCULAR; INTRAVENOUS ONCE
Status: COMPLETED | OUTPATIENT
Start: 2021-08-16 | End: 2021-08-16

## 2021-08-16 RX ORDER — CYCLOBENZAPRINE HCL 10 MG
10 TABLET ORAL 3 TIMES DAILY PRN
COMMUNITY
End: 2021-09-30

## 2021-08-16 RX ORDER — PREDNISONE 20 MG/1
50 TABLET ORAL ONCE
Status: COMPLETED | OUTPATIENT
Start: 2021-08-16 | End: 2021-08-16

## 2021-08-16 RX ADMIN — KETOROLAC TROMETHAMINE 60 MG: 30 INJECTION, SOLUTION INTRAMUSCULAR at 02:58

## 2021-08-16 RX ADMIN — ACETAMINOPHEN 1000 MG: 500 TABLET ORAL at 02:57

## 2021-08-16 RX ADMIN — PREDNISONE 50 MG: 20 TABLET ORAL at 02:58

## 2021-08-16 ASSESSMENT — PAIN SCALES - GENERAL
PAINLEVEL_OUTOF10: 4
PAINLEVEL_OUTOF10: 7

## 2021-08-16 NOTE — LETTER
[facility]  [address] [address]  Phone: [phone]    No name on file. August 16, 2021     Patient: Faisal Muñoz   YOB: 1990   Date of Visit: 8/16/2021       To Whom It May Concern: It is my medical opinion that Mamie Wesley may return to work on 08/17/2021. If you have any questions or concerns, please don't hesitate to call. Sincerely,        No name on file.

## 2021-08-16 NOTE — ED PROVIDER NOTES
EMERGENCY DEPARTMENT ENCOUNTER    Pt Name: Manjula Orona  MRN: [id number]  [place] 1990  Date of evaluation: 8/16/21  CHIEF COMPLAINT       Chief Complaint   Patient presents with    Shoulder Pain     HISTORY OF PRESENT ILLNESS   Patient is an ambidextrous 54-year-old female who presents to the ED for evaluation of left shoulder pain x3 months. Symptoms have progressively worsened over the past week. No trauma. Patient was seen in urgent care 2 days ago and was given steroid injection which helped for short period of time. No other issues at this time. No fevers, cough, shortness of breath, chest pain, abdominal pain, nausea, vomiting, changes in urine or stool. REVIEW OF SYSTEMS     Review of Systems   All other systems reviewed and are negative. PASTMEDICAL HISTORY     Past Medical History:   Diagnosis Date    Anxiety     Borderline personality disorder ([facility] [address]     Depression     Dysphagia     Latex allergy     Psoriasis      SURGICAL HISTORY     History reviewed. No pertinent surgical history. CURRENT MEDICATIONS       Previous Medications    CLONAZEPAM (KLONOPIN) 0.25 MG DISINTEGRATING TABLET    Take 1 tablet by mouth nightly as needed for Anxiety for up to 10 days. CYCLOBENZAPRINE (FLEXERIL) 10 MG TABLET    Take 10 mg by mouth 3 times daily as needed for Muscle spasms    HYDROXYZINE (ATARAX) 10 MG TABLET    Take 10 mg by mouth every 8 hours as needed for Itching    NORETHINDRONE-ETHINYL ESTRADIOL-IRON (LOESTRIN FE 1.5/30) 1.5-30 MG-MCG TABLET    Take 1 tablet by mouth daily    NYSTATIN (MYCOSTATIN) 286771 UNIT/GM OINTMENT    Apply topically 2 times daily. OXCARBAZEPINE (TRILEPTAL) 150 MG TABLET    take 1 tablet by mouth twice a day     ALLERGIES     is allergic to latex and pollen extract. FAMILY HISTORY     She indicated that her mother is alive. She indicated that her father is alive.      SOCIAL HISTORY       Social History     Tobacco Use    Smoking status: Never Smoker    Smokeless tobacco: Never Used   Vaping Use    Vaping Use: Never used   Substance Use Topics    Alcohol use: Yes     Comment: once a year    Drug use: No     PHYSICAL EXAM     INITIAL VITALS: /73   Pulse 82   Temp 98.2 °F (36.8 °C) (Oral)   Resp 18   Ht 5' 4\" (1.626 m)   Wt 184 lb (83.5 kg)   LMP 08/11/2021   SpO2 99%   BMI 31.58 kg/m²    Physical Exam  HENT:      Head: Normocephalic. Right Ear: External ear normal.      Left Ear: External ear normal.      Nose: Nose normal.   Eyes:      Conjunctiva/sclera: Conjunctivae normal.   Neck:      Comments: No midline neck tenderness. Left tibias muscle tenderness. Cardiovascular:      Rate and Rhythm: Normal rate. Pulmonary:      Effort: Pulmonary effort is normal.   Abdominal:      General: Abdomen is flat. Skin:     General: Skin is dry. Neurological:      Mental Status: She is alert. Mental status is at baseline. Psychiatric:         Mood and Affect: Mood normal.         Behavior: Behavior normal.         MEDICAL DECISION MAKING:   The patient is hemodynamically stable, afebrile, nontoxic-appearing. Physical exam notable for left trapezius muscle tenderness. Based on history and exam likely muscle strain, cervical radiculopathy. ED plan for shoulder x-ray, Toradol, Tylenol, prednisone, reassess. DIAGNOSTIC RESULTS   EKG:All EKG's are interpreted by the Emergency Department Physician who either signs or Co-signs this chart in the absence of a cardiologist.        RADIOLOGY:All plain film, CT, MRI, and formal ultrasound images (except ED bedside ultrasound) are read by the radiologist, see reports below, unless otherwisenoted in MDM or here. XR SHOULDER LEFT (MIN 2 VIEWS)   Final Result   No acute abnormality. LABS: All lab results were reviewed by myself, and all abnormals are listed below. Labs Reviewed - No data to display    EMERGENCY DEPARTMENTCOURSE:   Patient did well in the ED.   X-ray negative for acute osseous injury. Symptoms improved with Toradol, Tylenol and prednisone. Given Rx for prednisone. Advised to take NSAIDs. No further work-up indicated at this time. Nursing notes reviewed. At this time this is what I find, the patient appears well and does not appear sick or toxic. I gave my usual and customary discussion of the risks and benefits of discharge versus admission. I answered the patient's questions. I gave the patient strict return precautions. Patient expressed understanding of the discharge instructions. Dictated but not reviewed. Vitals:    Vitals:    08/16/21 0211 08/16/21 0212   BP:  116/73   Pulse: 82    Resp: 18    Temp: 98.2 °F (36.8 °C)    TempSrc: Oral    SpO2: 99%    Weight: 184 lb (83.5 kg)    Height: 5' 4\" (1.626 m)        The patient was given the following medications while in the emergency department:  Orders Placed This Encounter   Medications    ketorolac (TORADOL) injection 60 mg    acetaminophen (TYLENOL) tablet 1,000 mg    predniSONE (DELTASONE) tablet 50 mg     CONSULTS:  None    FINAL IMPRESSION      1. Acute pain of left shoulder    2. Cervical radiculopathy          DISPOSITION/PLAN   DISPOSITION        PATIENT REFERRED TO:  Leticia Madrid PA-C  [address] [facility] [id number] [phone]    In 2 days      DISCHARGE MEDICATIONS:  New Prescriptions    No medications on file     Sharri Ibrahim MD  Attending Emergency Physician                    Eli Dueñas MD  08/16/21 5027

## 2021-09-30 ENCOUNTER — OFFICE VISIT (OUTPATIENT)
Dept: FAMILY MEDICINE CLINIC | Age: 31
End: 2021-09-30
Payer: MEDICARE

## 2021-09-30 VITALS
DIASTOLIC BLOOD PRESSURE: 60 MMHG | WEIGHT: 188 LBS | SYSTOLIC BLOOD PRESSURE: 102 MMHG | HEIGHT: 64 IN | HEART RATE: 45 BPM | TEMPERATURE: 97.3 F | BODY MASS INDEX: 32.1 KG/M2 | OXYGEN SATURATION: 99 %

## 2021-09-30 DIAGNOSIS — M50.30 DDD (DEGENERATIVE DISC DISEASE), CERVICAL: Primary | ICD-10-CM

## 2021-09-30 DIAGNOSIS — R42 VERTIGO: ICD-10-CM

## 2021-09-30 DIAGNOSIS — Z23 NEED FOR INFLUENZA VACCINATION: ICD-10-CM

## 2021-09-30 DIAGNOSIS — N92.6 IRREGULAR MENSES: ICD-10-CM

## 2021-09-30 DIAGNOSIS — Z30.09 BIRTH CONTROL COUNSELING: ICD-10-CM

## 2021-09-30 PROCEDURE — 99214 OFFICE O/P EST MOD 30 MIN: CPT | Performed by: PHYSICIAN ASSISTANT

## 2021-09-30 PROCEDURE — G8427 DOCREV CUR MEDS BY ELIG CLIN: HCPCS | Performed by: PHYSICIAN ASSISTANT

## 2021-09-30 PROCEDURE — 90471 IMMUNIZATION ADMIN: CPT | Performed by: PHYSICIAN ASSISTANT

## 2021-09-30 PROCEDURE — 90674 CCIIV4 VAC NO PRSV 0.5 ML IM: CPT | Performed by: PHYSICIAN ASSISTANT

## 2021-09-30 PROCEDURE — 1036F TOBACCO NON-USER: CPT | Performed by: PHYSICIAN ASSISTANT

## 2021-09-30 PROCEDURE — G8417 CALC BMI ABV UP PARAM F/U: HCPCS | Performed by: PHYSICIAN ASSISTANT

## 2021-09-30 RX ORDER — LIDOCAINE 50 MG/G
PATCH TOPICAL
COMMUNITY
Start: 2021-08-17 | End: 2021-09-30

## 2021-09-30 RX ORDER — LEVONORGESTREL AND ETHINYL ESTRADIOL 0.1-0.02MG
1 KIT ORAL DAILY
Qty: 1 PACKET | Refills: 3 | Status: SHIPPED | OUTPATIENT
Start: 2021-09-30 | End: 2022-02-22

## 2021-09-30 RX ORDER — MECLIZINE HYDROCHLORIDE 25 MG/1
25 TABLET ORAL 3 TIMES DAILY PRN
Qty: 30 TABLET | Refills: 0 | Status: SHIPPED | OUTPATIENT
Start: 2021-09-30 | Stop reason: SDUPTHER

## 2021-09-30 ASSESSMENT — ENCOUNTER SYMPTOMS: COLOR CHANGE: 0

## 2021-09-30 NOTE — PROGRESS NOTES
Visit Information    Have you changed or started any medications since your last visit including any over-the-counter medicines, vitamins, or herbal medicines? no   Are you having any side effects from any of your medications? -  no  Have you stopped taking any of your medications? Is so, why? -  no    Have you seen any other physician or provider since your last visit? No  Have you had any other diagnostic tests since your last visit? No  Have you been seen in the emergency room and/or had an admission to a hospital since we last saw you? No  Have you had your routine dental cleaning in the past 6 months? no    Have you activated your [employer] account? If not, what are your barriers? Yes     Patient Care Team:  Yolie Davila PA-C as PCP - General (Family Medicine)  Yolie Davila PA-C as PCP - [facility]    Medical History Review  Past Medical, Family, and Social History reviewed and  contribute to the patient presenting condition    Health Maintenance   Topic Date Due    Hepatitis C screen  Never done    Varicella vaccine (1 of 2 - 2-dose childhood series) Never done    Cervical cancer screen  09/28/2020    Flu vaccine (1) 09/01/2021    DTaP/Tdap/Td vaccine (2 - Td or Tdap) 07/24/2028    COVID-19 Vaccine  Completed    HIV screen  Completed    Hepatitis A vaccine  Aged Out    Hepatitis B vaccine  Aged Out    Hib vaccine  Aged Out    Meningococcal (ACWY) vaccine  Aged Out    Pneumococcal 0-64 years Vaccine  Aged [employer], \"Influenza - Inactivated\"  given to Shahrzad Reagin, or parent/legal guardian of  Shahrzad Catyin and verbalized understanding. Patient responses:    Have you ever had a reaction to a flu vaccine? No  Are you able to eat eggs without adverse effects? Yes  Do you have any current illness? No  Have you ever had Guillian [place] Syndrome? No    Flu vaccine given per order. Please see immunization tab.   After obtaining consent, and per orders of Pyrolia PA-C, injection of flu shot given in Right deltoid by John Oliver MA. Patient instructed to remain in clinic for 20 minutes afterwards, and to report any adverse reaction to me immediately.

## 2021-09-30 NOTE — PATIENT INSTRUCTIONS
Patient Education        Epley Maneuver at Home for Vertigo: Exercises  Introduction  Vertigo is a spinning or whirling sensation when you move your head. Your doctor may have moved you in different positions to help your vertigo get better faster. This is called the Epley maneuver. Your doctor also may have asked you to do these exercises at home. Do the exercises as often as your doctor recommends. If your vertigo is getting worse, your doctor may have you change the exercise or stop it. Step 1  Step 1    1. Sit on the edge of a bed or sofa. Step 2    1. Turn your head 45 degrees in the direction your doctor told you to. This should be toward the ear that causes the most vertigo for you. In this picture, the woman is turning toward her left ear. Step 3    1. Tilt yourself backward until you are lying on your back. Your head should still be at a 45-degree turn. Your head should be about midway between looking straight ahead and looking out to your side. Hold for 30 seconds. If you have vertigo, stay in this position until it stops. Step 4    1. Turn your head 90 degrees toward the ear that has the least vertigo. In this picture, the woman is turning to the right because she has vertigo on her left side. The point of your chin should be raised and over your shoulder. Hold for 30 seconds. Step 5    1. Roll onto the side with the least vertigo. You should now be looking at the floor. Hold for 30 seconds. Follow-up care is a key part of your treatment and safety. Be sure to make and go to all appointments, and call your doctor if you are having problems. It's also a good idea to know your test results and keep a list of the medicines you take. Where can you learn more? Go to https://chtaniaeb.[employer]. org and sign in to your [employer] account. Enter F418 in the IQMS box to learn more about \"Epley Maneuver at Home for Vertigo: Exercises. \"     If you do not have an account, please click on the \"Sign Up Now\" link. Current as of: April 8, 2021               Content Version: 13.0  © 2006-2021 [employer]. Care instructions adapted under license by [facility] ([facility]). If you have questions about a medical condition or this instruction, always ask your healthcare professional. [address] any warranty or liability for your use of this information. Patient Education        Neck Arthritis: Exercises  Introduction  Here are some examples of exercises for you to try. The exercises may be suggested for a condition or for rehabilitation. Start each exercise slowly. Ease off the exercises if you start to have pain. You will be told when to start these exercises and which ones will work best for you. How to do the exercises  Neck stretches to the side    1. This stretch works best if you keep your shoulder down as you lean away from it. To help you remember to do this, start by relaxing your shoulders and lightly holding on to your thighs or your chair. 2. Tilt your head toward your shoulder and hold for 15 to 30 seconds. Let the weight of your head stretch your muscles. 3. Repeat 2 to 4 times toward each shoulder. Chin tuck    1. Lie on the floor with a rolled-up towel under your neck. Your head should be touching the floor. 2. Slowly bring your chin toward your chest.  3. Hold for a count of 6, and then relax for up to 10 seconds. 4. Repeat 8 to 12 times. Active cervical rotation    1. Sit in a firm chair, or stand up straight. 2. Keeping your chin level, turn your head to the right, and hold for 15 to 30 seconds. 3. Turn your head to the left and hold for 15 to 30 seconds. 4. Repeat 2 to 4 times to each side. Shoulder blade squeeze    1. While standing, squeeze your shoulder blades together. 2. Do not raise your shoulders up as you are squeezing. 3. Hold for 6 seconds. 4. Repeat 8 to 12 times. Shoulder rolls    1.  Sit comfortably with your feet shoulder-width apart. You can also do this exercise standing up. 2. Roll your shoulders up, then back, and then down in a smooth, circular motion. 3. Repeat 2 to 4 times. Follow-up care is a key part of your treatment and safety. Be sure to make and go to all appointments, and call your doctor if you are having problems. It's also a good idea to know your test results and keep a list of the medicines you take. Where can you learn more? Go to https://"[employer]. org and sign in to your [employer] account. Enter C521 in the Tamoco box to learn more about \"Neck Arthritis: Exercises. \"     If you do not have an account, please click on the \"Sign Up Now\" link. Current as of: July 1, 2021               Content Version: 13.0  © 5539-2040 [employer]. Care instructions adapted under license by [facility] ([facility]). If you have questions about a medical condition or this instruction, always ask your healthcare professional. [address] any warranty or liability for your use of this information.

## 2021-09-30 NOTE — PROGRESS NOTES
[address] [facility]  [address]  [address] [place] [id number]  Dept: [phone]  Dept Fax: [phone]    Kofi Dior is a 32 y.o. female who presents today for her medical conditions/complaintsas noted below. Kofi Dior is c/o of   Chief Complaint   Patient presents with    Neck Pain     discuss MRI, arthritis    Numbness     bilateral hands and feet    Contraception     discuss different options for birth controle         HPI:     HPI    Patient here to review recent cervical MRI. She had been seeing Dr. Champ Hunter for this recently and states has not followed up with him since. She is a big  so looks down frequently while playing games  She has noticed numbness in hands and feet that is periodic. She states worse if sleeps wrong at night. She notes it mostly on her left arm. She is ambidextrous. She also reports she is getting headaches. Has been trying to drink more water. She has also been seeing her chiropractor for adjustment. She does not note that there is any limitation in her ROM. She also updates that she felt anxious taking the birth control she was using. She stopped using it at this point. She is not sexually active    She has also been feeling more tired and occasional dizziness. She notices the dizziness when she moves or changes position, horizontal movement. Worse when she changes her head position.      No results found for: LABA1C          ( goal A1Cis < 7)   No results found for: LABMICR  No results found for: LDLCHOLESTEROL, LDLCALC    (goal LDL is <100)   AST (U/L)   Date Value   02/05/2017 21     ALT (U/L)   Date Value   02/05/2017 14     BUN (mg/dL)   Date Value   12/27/2020 12     BP Readings from Last 3 Encounters:   09/30/21 102/60   08/16/21 116/73   06/01/21 126/74          (goal 120/80)    Past Medical History:   Diagnosis Date    Anxiety     Borderline personality disorder ([facility] [address]     Depression     Dysphagia     Latex allergy     Psoriasis       History reviewed. No pertinent surgical history. History reviewed. No pertinent family history. Social History     Tobacco Use    Smoking status: Never Smoker    Smokeless tobacco: Never Used   Substance Use Topics    Alcohol use: Yes     Comment: once a year      Current Outpatient Medications   Medication Sig Dispense Refill    levonorgestrel-ethinyl estradiol (AVIANE) 0.1-20 MG-MCG per tablet Take 1 tablet by mouth daily 1 packet 3    meclizine (ANTIVERT) 25 MG tablet Take 1 tablet by mouth 3 times daily as needed for Dizziness 30 tablet 0    hydrOXYzine (ATARAX) 10 MG tablet Take 10 mg by mouth every 8 hours as needed for Itching      clonazePAM (KLONOPIN) 0.25 MG disintegrating tablet Take 1 tablet by mouth nightly as needed for Anxiety for up to 10 days. 10 tablet 0    OXcarbazepine (TRILEPTAL) 150 MG tablet take 1 tablet by mouth twice a day 60 tablet 1     No current facility-administered medications for this visit. Allergies   Allergen Reactions    Latex     Pollen Extract        Health Maintenance   Topic Date Due    Hepatitis C screen  Never done    Varicella vaccine (1 of 2 - 2-dose childhood series) Never done    Cervical cancer screen  09/28/2020    Flu vaccine (1) 09/01/2021    DTaP/Tdap/Td vaccine (2 - Td or Tdap) 07/24/2028    COVID-19 Vaccine  Completed    HIV screen  Completed    Hepatitis A vaccine  Aged Out    Hepatitis B vaccine  Aged Out    Hib vaccine  Aged Out    Meningococcal (ACWY) vaccine  Aged Out    Pneumococcal 0-64 years Vaccine  Aged Out       Subjective:     Review of Systems   Constitutional: Negative for activity change, appetite change, fatigue, fever and unexpected weight change. HENT: Negative for congestion, ear discharge and ear pain. Eyes: Negative for visual disturbance. Genitourinary: Positive for menstrual problem. Negative for pelvic pain. Musculoskeletal: Positive for neck pain. (AVIANE) 0.1-20 MG-MCG per tablet   3. Birth control counseling  levonorgestrel-ethinyl estradiol (AVIANE) 0.1-20 MG-MCG per tablet   4. Need for influenza vaccination  INFLUENZA, MDCK QUADV, 2 YRS AND OLDER, IM, PF, PREFILL SYR OR SDV, 0.5ML (FLUCELVAX QUADV, PF)   5. Vertigo               Plan:      Return if symptoms worsen or fail to improve. [facility]. Patient interested in seeing neurosurgeon for opinion. Referral given  She would like to continue on an oral BCP. We discussed trying lower dose. She will call if any issues tolerating this  Vertigo suspected. Hand out on epley manuvers given for home use. antivert prn for symptoms  patient agreed with treatment plan  Follow up if any concerns persist  Health Maintenance reviewed - Flu shot given. Orders Placed This Encounter   Procedures    INFLUENZA, MDCK QUADV, 2 YRS AND OLDER, IM, PF, PREFILL SYR OR SDV, 0.5ML (Karrin Heads, PF)    Consuelo Braden DO, Neurosurgery, [address]     Referral Priority:   Routine     Referral Type:   Eval and Treat     Referral Reason:   Specialty Services Required     Referred to Provider:   Lele Hoover DO     Requested Specialty:   Neurosurgery     Number of Visits Requested:   1         Patient given educational materials - see patient instructions. Discussed use, benefit, and side effects of prescribed medications. All patientquestions answered. Pt voiced understanding. Reviewed health maintenance. Instructedto continue current medications, diet and exercise. Patient agreed with treatmentplan. Follow up as directed.      Electronically signed by Nga Moore PA-C on 9/30/2021 at 10:20 AM

## 2021-10-15 RX ORDER — MECLIZINE HYDROCHLORIDE 25 MG/1
TABLET ORAL
Qty: 30 TABLET | Refills: 0 | Status: SHIPPED | OUTPATIENT
Start: 2021-10-15 | End: 2022-02-22

## 2021-10-27 ENCOUNTER — OFFICE VISIT (OUTPATIENT)
Dept: NEUROSURGERY | Age: 31
End: 2021-10-27
Payer: MEDICARE

## 2021-10-27 VITALS
HEART RATE: 51 BPM | DIASTOLIC BLOOD PRESSURE: 60 MMHG | WEIGHT: 188 LBS | BODY MASS INDEX: 32.1 KG/M2 | SYSTOLIC BLOOD PRESSURE: 93 MMHG | OXYGEN SATURATION: 100 % | HEIGHT: 64 IN | TEMPERATURE: 97 F

## 2021-10-27 DIAGNOSIS — M47.22 CERVICAL SPONDYLOSIS WITH RADICULOPATHY: Primary | ICD-10-CM

## 2021-10-27 PROCEDURE — G8482 FLU IMMUNIZE ORDER/ADMIN: HCPCS | Performed by: NURSE PRACTITIONER

## 2021-10-27 PROCEDURE — 1036F TOBACCO NON-USER: CPT | Performed by: NURSE PRACTITIONER

## 2021-10-27 PROCEDURE — G8427 DOCREV CUR MEDS BY ELIG CLIN: HCPCS | Performed by: NURSE PRACTITIONER

## 2021-10-27 PROCEDURE — 99203 OFFICE O/P NEW LOW 30 MIN: CPT | Performed by: NURSE PRACTITIONER

## 2021-10-27 PROCEDURE — G8417 CALC BMI ABV UP PARAM F/U: HCPCS | Performed by: NURSE PRACTITIONER

## 2021-10-27 RX ORDER — PREDNISONE 20 MG/1
TABLET ORAL
Qty: 30 TABLET | Refills: 0 | Status: SHIPPED | OUTPATIENT
Start: 2021-10-27 | End: 2021-11-11

## 2021-10-27 NOTE — PROGRESS NOTES
[place]  [facility] [place] [facility] [address] [facility],[address] [address]  Dept: [phone]    Patient:  Ana Peraza  YOB: 1990  Date: 10/27/21    The patient is a 32 y.o. female who presents today for consult of the following problems:     Chief Complaint   Patient presents with    New Patient     degenerative disc disease         HPI:     Ana Peraza is a 32 y.o. female on whom neurosurgical consultation was requested by Kaela Segura PA-C for management of neck pain and arm symptoms. Reports having had left shoulder/neck pain for the last 3-4 months. Pain can range from 4-8/10 depending on physical activity. Intermittent numbness and tingling to both arms, non-dermatomal distribution, left > right. Rare clumsiness/weakness to hands. Utilizing ice periodically, as well as ibuprofen. Did initially explore left shoulder as source of pain, was evaluated by orthopedic specialist through [address] [place] [address], and sent for MRI cervical spine and subsequently here. Nocturnal Awakening: No  Reason: n/a    MYELOPATHY    Frequently dropping things: No  Difficulty with buttoning clothes, using zipper, putting on watch OR jewelry: No  Changes in handwriting: No  Numbness or tingling: Yes    LIMITATIONS    Pain significantly limiting on a daily basis   Daily pharmacologic pain control include: ibuprofen  Neck : Arm pain: Neck 50 // Arms 50    MANAGEMENT     Prior Surgery: No  Prior to 1yr ago: In the last year:    Physical Therapy: No   Chiropractic Interventions: No   Injections: No  Improvement: n/a    Injections/response: n/a    History:     Past Medical History:   Diagnosis Date    Anxiety     Borderline personality disorder ([facility] [address]     Depression     Dysphagia     Latex allergy     Psoriasis      History reviewed. No pertinent surgical history. History reviewed. No pertinent family history.   Current Outpatient Medications on File Prior to Visit   Medication Sig Dispense Refill    meclizine (ANTIVERT) 25 MG tablet take 1 tablet by mouth three times a day if needed for dizziness 30 tablet 0    levonorgestrel-ethinyl estradiol (AVIANE) 0.1-20 MG-MCG per tablet Take 1 tablet by mouth daily 1 packet 3    hydrOXYzine (ATARAX) 10 MG tablet Take 10 mg by mouth every 8 hours as needed for Itching      clonazePAM (KLONOPIN) 0.25 MG disintegrating tablet Take 1 tablet by mouth nightly as needed for Anxiety for up to 10 days. 10 tablet 0    OXcarbazepine (TRILEPTAL) 150 MG tablet take 1 tablet by mouth twice a day 60 tablet 1    [DISCONTINUED] meclizine (ANTIVERT) 25 MG tablet Take 1 tablet by mouth 3 times daily as needed for Dizziness 30 tablet 0     No current facility-administered medications on file prior to visit. Social History     Tobacco Use    Smoking status: Never Smoker    Smokeless tobacco: Never Used   Vaping Use    Vaping Use: Never used   Substance Use Topics    Alcohol use: Yes     Comment: once a year    Drug use: No       Allergies   Allergen Reactions    Latex     Pollen Extract        Review of Systems  Constitutional: Negative for activity change and appetite change. HENT: Negative for ear pain and facial swelling. Eyes: Negative for discharge and itching. Respiratory: Negative for choking and chest tightness. Cardiovascular: Negative for chest pain and leg swelling. Gastrointestinal: Negative for nausea and abdominal pain. Endocrine: Negative for cold intolerance and heat intolerance. Genitourinary: Negative for frequency and flank pain. Musculoskeletal: Negative for myalgias and joint swelling. Skin: Negative for rash and wound. Allergic/Immunologic: Negative for environmental allergies and food allergies. Hematological: Negative for adenopathy. Does not bruise/bleed easily. Psychiatric/Behavioral: Negative for self-injury. The patient is not nervous/anxious.       Physical Exam: BP 93/60   Pulse 51   Temp 97 °F (36.1 °C) (Temporal)   Ht 5' 4\" (1.626 m)   Wt 188 lb (85.3 kg)   SpO2 100%   BMI 32.27 kg/m²   Estimated body mass index is 32.27 kg/m² as calculated from the following:    Height as of this encounter: 5' 4\" (1.626 m). Weight as of this encounter: 188 lb (85.3 kg). General:  Argenis Drummond is a 32y.o. year old female who appears her stated age. HEENT: Normocephalic atraumatic. Neck supple. Chest: regular rate; pulses equal  Abdomen: Soft nontender nondistended.    Ext: DP and PT pulses 2+, good cap refill  Neuro    Mentation  Appropriate affect  Registration intact  Orientation intact  3 item recall intact  Judgement intact to situation    Cranial Nerves:   Pupils equal and reactive to light  Extraocular motion intact  Face and shrug symmetric  Tongue midline  No dysarthria  v1-3 sensation symmetric, masseter tone symmetric  Hearing symmetric and intact    Sensation: Intact  Ring finger split: Negative    Motor  L deltoid 5/5; R deltoid 5/5  L biceps 5/5; R biceps 5/5  L triceps 5/5; R triceps 5/5  L wrist extension 5/5; R wrist extension 5/5  L intrinsics 5/5; R intrinsics 5/5     L iliopsoas 5/5 , R iliopsoas 5/5  L quadriceps 5/5; R quadriceps 5/5  L Dorsiflexion 5/5; R dorsiflexion 5/5  L Plantarflexion 5/5; R plantarflexion 5/5  L EHL 5/5; R EHL 5/5    Reflexes  L Brachioradialis 2+/4; R brachioradialis 2+/4  L Biceps 2+/4; R Biceps 2+/4  L Triceps 2+/4; R Triceps 2+/4  L Patellar 2+/4: R Patellar 2+/4  L Achilles 2+/4; R Achilles 2+/4    hoffmans L: neg  hoffmans R: neg  Clonus L: neg  Clonus R: neg  Babinski L: neg  Babinski R: neg    Spurlings: No  Lhermittes: No    Tinels at elbow: No  Tinels at wrist: No  Phalens: No  Ok sign: No  Froments: No  Benedictine Hand: No    Atrophy of thenar: No  Atrophy of hypothenar: No    Studies Review:     MRI cervical spine 9/24/2021 (awaiting images):  Findings:     Signal intensity and morphology of the included Sharon     Referral Priority:   Routine     Referral Type:   Eval and Treat     Referral Reason:   Specialty Services Required     Requested Specialty:   Physical Therapy     Number of Visits Requested:   1        Electronically signed by BRIGHT Crespo CNP on 10/27/2021 at 4:14 PM    Please note that this chart was generated using voice recognition Dragon dictation software. Although every effort was made to ensure the accuracy of this automated transcription, some errors in transcription may have occurred.

## 2021-11-09 ENCOUNTER — OFFICE VISIT (OUTPATIENT)
Dept: FAMILY MEDICINE CLINIC | Age: 31
End: 2021-11-09
Payer: MEDICARE

## 2021-11-09 ENCOUNTER — HOSPITAL ENCOUNTER (OUTPATIENT)
Age: 31
Setting detail: SPECIMEN
Discharge: HOME | End: 2021-11-09
Payer: MEDICARE

## 2021-11-09 VITALS
BODY MASS INDEX: 32.1 KG/M2 | OXYGEN SATURATION: 99 % | HEART RATE: 93 BPM | HEIGHT: 64 IN | TEMPERATURE: 97 F | DIASTOLIC BLOOD PRESSURE: 78 MMHG | WEIGHT: 188 LBS | SYSTOLIC BLOOD PRESSURE: 118 MMHG

## 2021-11-09 DIAGNOSIS — Z13.220 SCREENING, LIPID: ICD-10-CM

## 2021-11-09 DIAGNOSIS — H61.22 IMPACTED CERUMEN OF LEFT EAR: ICD-10-CM

## 2021-11-09 DIAGNOSIS — R53.83 FATIGUE, UNSPECIFIED TYPE: ICD-10-CM

## 2021-11-09 DIAGNOSIS — R42 DIZZINESS: Primary | ICD-10-CM

## 2021-11-09 LAB
ABSOLUTE EOS #: 0.03 K/UL (ref 0–0.44)
ABSOLUTE IMMATURE GRANULOCYTE: 0.04 K/UL (ref 0–0.3)
ABSOLUTE LYMPH #: 1.97 K/UL (ref 1.1–3.7)
ABSOLUTE MONO #: 0.65 K/UL (ref 0.1–1.2)
ALBUMIN SERPL-MCNC: 4.3 G/DL (ref 3.5–5.2)
ALBUMIN/GLOBULIN RATIO: 1.4 (ref 1–2.5)
ALP BLD-CCNC: 65 U/L (ref 35–104)
ALT SERPL-CCNC: 15 U/L (ref 5–33)
ANION GAP SERPL CALCULATED.3IONS-SCNC: 19 MMOL/L (ref 9–17)
AST SERPL-CCNC: 20 U/L
BASOPHILS # BLD: 0 % (ref 0–2)
BASOPHILS ABSOLUTE: 0.03 K/UL (ref 0–0.2)
BILIRUB SERPL-MCNC: 0.46 MG/DL (ref 0.3–1.2)
BUN BLDV-MCNC: 13 MG/DL (ref 6–20)
BUN/CREAT BLD: ABNORMAL (ref 9–20)
CALCIUM SERPL-MCNC: 9.3 MG/DL (ref 8.6–10.4)
CHLORIDE BLD-SCNC: 106 MMOL/L (ref 98–107)
CHOLESTEROL/HDL RATIO: 2.7
CHOLESTEROL: 117 MG/DL
CO2: 16 MMOL/L (ref 20–31)
CONTROL: POSITIVE
CREAT SERPL-MCNC: 0.76 MG/DL (ref 0.5–0.9)
DIFFERENTIAL TYPE: ABNORMAL
EOSINOPHILS RELATIVE PERCENT: 0 % (ref 1–4)
GFR AFRICAN AMERICAN: >60 ML/MIN
GFR NON-AFRICAN AMERICAN: >60 ML/MIN
GFR SERPL CREATININE-BSD FRML MDRD: ABNORMAL ML/MIN/{1.73_M2}
GFR SERPL CREATININE-BSD FRML MDRD: ABNORMAL ML/MIN/{1.73_M2}
GLUCOSE BLD-MCNC: 81 MG/DL (ref 70–99)
HCT VFR BLD CALC: 42.7 % (ref 36.3–47.1)
HDLC SERPL-MCNC: 43 MG/DL
HEMOGLOBIN: 13.5 G/DL (ref 11.9–15.1)
IMMATURE GRANULOCYTES: 0 %
IRON SATURATION: 24 % (ref 20–55)
IRON: 56 UG/DL (ref 37–145)
LDL CHOLESTEROL: 49 MG/DL (ref 0–130)
LYMPHOCYTES # BLD: 17 % (ref 24–43)
MCH RBC QN AUTO: 27.8 PG (ref 25.2–33.5)
MCHC RBC AUTO-ENTMCNC: 31.6 G/DL (ref 28.4–34.8)
MCV RBC AUTO: 88 FL (ref 82.6–102.9)
MONOCYTES # BLD: 6 % (ref 3–12)
NRBC AUTOMATED: 0 PER 100 WBC
PDW BLD-RTO: 13.4 % (ref 11.8–14.4)
PLATELET # BLD: 227 K/UL (ref 138–453)
PLATELET ESTIMATE: ABNORMAL
PMV BLD AUTO: 11.4 FL (ref 8.1–13.5)
POTASSIUM SERPL-SCNC: 4.2 MMOL/L (ref 3.7–5.3)
PREGNANCY TEST URINE, POC: NEGATIVE
RBC # BLD: 4.85 M/UL (ref 3.95–5.11)
RBC # BLD: ABNORMAL 10*6/UL
SEG NEUTROPHILS: 77 % (ref 36–65)
SEGMENTED NEUTROPHILS ABSOLUTE COUNT: 8.68 K/UL (ref 1.5–8.1)
SODIUM BLD-SCNC: 141 MMOL/L (ref 135–144)
TOTAL IRON BINDING CAPACITY: 238 UG/DL (ref 250–450)
TOTAL PROTEIN: 7.3 G/DL (ref 6.4–8.3)
TRIGL SERPL-MCNC: 124 MG/DL
TSH SERPL DL<=0.05 MIU/L-ACNC: 2.36 MIU/L (ref 0.3–5)
UNSATURATED IRON BINDING CAPACITY: 182 UG/DL (ref 112–347)
VLDLC SERPL CALC-MCNC: NORMAL MG/DL (ref 1–30)
WBC # BLD: 11.4 K/UL (ref 3.5–11.3)
WBC # BLD: ABNORMAL 10*3/UL

## 2021-11-09 PROCEDURE — G8427 DOCREV CUR MEDS BY ELIG CLIN: HCPCS | Performed by: PHYSICIAN ASSISTANT

## 2021-11-09 PROCEDURE — G8417 CALC BMI ABV UP PARAM F/U: HCPCS | Performed by: PHYSICIAN ASSISTANT

## 2021-11-09 PROCEDURE — G8482 FLU IMMUNIZE ORDER/ADMIN: HCPCS | Performed by: PHYSICIAN ASSISTANT

## 2021-11-09 PROCEDURE — 1036F TOBACCO NON-USER: CPT | Performed by: PHYSICIAN ASSISTANT

## 2021-11-09 PROCEDURE — 81025 URINE PREGNANCY TEST: CPT | Performed by: PHYSICIAN ASSISTANT

## 2021-11-09 PROCEDURE — 99214 OFFICE O/P EST MOD 30 MIN: CPT | Performed by: PHYSICIAN ASSISTANT

## 2021-11-09 ASSESSMENT — ENCOUNTER SYMPTOMS
BACK PAIN: 0
COLOR CHANGE: 0

## 2021-11-09 NOTE — PROGRESS NOTES
Visit Information    Have you changed or started any medications since your last visit including any over-the-counter medicines, vitamins, or herbal medicines? no   Are you having any side effects from any of your medications? -  no  Have you stopped taking any of your medications? Is so, why? -  no    Have you seen any other physician or provider since your last visit? No  Have you had any other diagnostic tests since your last visit? No  Have you been seen in the emergency room and/or had an admission to a hospital since we last saw you? No  Have you had your routine dental cleaning in the past 6 months? no    Have you activated your [employer] account? If not, what are your barriers?  Yes     Patient Care Team:  Celestine Ontiveros PA-C as PCP - General (Family Medicine)  Celestine Ontiveros PA-C as PCP - [facility]    Medical History Review  Past Medical, Family, and Social History reviewed and  contribute to the patient presenting condition    Health Maintenance   Topic Date Due    Hepatitis C screen  Never done    Varicella vaccine (1 of 2 - 2-dose childhood series) Never done    Cervical cancer screen  09/28/2020    DTaP/Tdap/Td vaccine (2 - Td or Tdap) 07/24/2028    Flu vaccine  Completed    COVID-19 Vaccine  Completed    HIV screen  Completed    Hepatitis A vaccine  Aged Out    Hepatitis B vaccine  Aged Out    Hib vaccine  Aged Out    Meningococcal (ACWY) vaccine  Aged Out    Pneumococcal 0-64 years Vaccine  Aged Out

## 2021-11-09 NOTE — PATIENT INSTRUCTIONS
click on the \"Sign Up Now\" link. Current as of: April 8, 2021               Content Version: 13.0  © 3436-1109 [employer]. Care instructions adapted under license by [facility] ([facility]). If you have questions about a medical condition or this instruction, always ask your healthcare professional. [place] [address] any warranty or liability for your use of this information.

## 2021-11-09 NOTE — PROGRESS NOTES
[address] [facility]  [address] [address]  Dept: [phone]  Dept Fax: [phone]    Nestor Krueger is a 32 y.o. female who presents today for her medical conditions/complaintsas noted below. Nestor Krueger is c/o of   Chief Complaint   Patient presents with    Neck Pain     DDD cervical    Fatigue     discuss family hx of anemia         HPI:     HPI    Patient presently working at [employer]. She states she likes the work but the conditions are a struggle for her. She notes having frequent vertigo recently. She also has neck pain and fatigue recently. Work is understaffed. Patient is working in [employer] doing [employer] [place]. She had requested accommodation forms. She states she has an interview later this week for a new job. She reports no need to do the accomodation form at this time for [employer]. She updates she has seen neurosurgery for DDD of the neck recently. She is starting with PT for this in the near future. She has been diagnosed with cervical spondylosis    No results found for: LABA1C          ( goal A1Cis < 7)   No results found for: LABMICR  No results found for: LDLCHOLESTEROL, LDLCALC    (goal LDL is <100)   AST (U/L)   Date Value   02/05/2017 21     ALT (U/L)   Date Value   02/05/2017 14     BUN (mg/dL)   Date Value   12/27/2020 12     BP Readings from Last 3 Encounters:   11/09/21 118/78   10/27/21 93/60   09/30/21 102/60          (goal 120/80)    Past Medical History:   Diagnosis Date    Anxiety     Borderline personality disorder ([address]     Cervical spondylosis     Depression     Dysphagia     Latex allergy     Psoriasis       History reviewed. No pertinent surgical history. History reviewed. No pertinent family history.     Social History     Tobacco Use    Smoking status: Never Smoker    Smokeless tobacco: Never Used   Substance Use Topics    Alcohol use: Yes     Comment: once a year Current Outpatient Medications   Medication Sig Dispense Refill    predniSONE (DELTASONE) 20 MG tablet Take 3 tablets PO daily for first 5 days, then take 2 tablets PO daily for next 5 days, then take 1 tablet PO daily for last 5 days 30 tablet 0    meclizine (ANTIVERT) 25 MG tablet take 1 tablet by mouth three times a day if needed for dizziness 30 tablet 0    levonorgestrel-ethinyl estradiol (AVIANE) 0.1-20 MG-MCG per tablet Take 1 tablet by mouth daily 1 packet 3    hydrOXYzine (ATARAX) 10 MG tablet Take 10 mg by mouth every 8 hours as needed for Itching      clonazePAM (KLONOPIN) 0.25 MG disintegrating tablet Take 1 tablet by mouth nightly as needed for Anxiety for up to 10 days. 10 tablet 0    OXcarbazepine (TRILEPTAL) 150 MG tablet take 1 tablet by mouth twice a day 60 tablet 1     No current facility-administered medications for this visit. Allergies   Allergen Reactions    Latex     Pollen Extract        Health Maintenance   Topic Date Due    Hepatitis C screen  Never done    Varicella vaccine (1 of 2 - 2-dose childhood series) Never done    Cervical cancer screen  09/28/2020    DTaP/Tdap/Td vaccine (2 - Td or Tdap) 07/24/2028    Flu vaccine  Completed    COVID-19 Vaccine  Completed    HIV screen  Completed    Hepatitis A vaccine  Aged Out    Hepatitis B vaccine  Aged Out    Hib vaccine  Aged Out    Meningococcal (ACWY) vaccine  Aged Out    Pneumococcal 0-64 years Vaccine  Aged Out       Subjective:     Review of Systems   Constitutional: Positive for fatigue. Negative for activity change, appetite change and fever. Musculoskeletal: Positive for neck pain (known stenosis). Negative for arthralgias, back pain and neck stiffness. Skin: Negative for color change, pallor, rash and wound. Neurological: Positive for dizziness (intermittent). Negative for weakness, light-headedness, numbness and headaches. Hematological: Negative for adenopathy.    Psychiatric/Behavioral: Negative for sleep disturbance. The patient is not nervous/anxious. Objective:     Physical Exam  Vitals and nursing note reviewed. Constitutional:       General: She is not in acute distress. Appearance: Normal appearance. She is well-developed. She is obese. She is not ill-appearing. HENT:      Head: Normocephalic and atraumatic. Right Ear: Tympanic membrane, ear canal and external ear normal. There is no impacted cerumen. Left Ear: Ear canal and external ear normal. There is impacted cerumen. Ears:      Comments: Ceruminosis is noted. Wax is removed by syringing and manual debridement. Instructions for home care to prevent wax buildup are given. Eyes:      Extraocular Movements:      Right eye: No nystagmus. Left eye: No nystagmus. Cardiovascular:      Rate and Rhythm: Normal rate and regular rhythm. Heart sounds: No murmur heard. Pulmonary:      Effort: Pulmonary effort is normal.      Breath sounds: Normal breath sounds. No wheezing, rhonchi or rales. Skin:     General: Skin is warm and dry. Coloration: Skin is not pale. Findings: No erythema or rash. Neurological:      Mental Status: She is alert and oriented to person, place, and time. Coordination: Romberg sign negative. Coordination normal.      Gait: Gait is intact. Psychiatric:         Mood and Affect: Mood normal.         Behavior: Behavior normal.         Thought Content: Thought content normal.         Judgment: Judgment normal.       /78 (Site: Left Upper Arm, Position: Sitting, Cuff Size: Large Adult)   Pulse 93   Temp 97 °F (36.1 °C) (Tympanic)   Ht 5' 4\" (1.626 m)   Wt 188 lb (85.3 kg)   SpO2 99%   BMI 32.27 kg/m²     Assessment:       Diagnosis Orders   1. Dizziness     2. Fatigue, unspecified type  POCT urine pregnancy    Comprehensive Metabolic Panel    CBC Auto Differential    TSH with Reflex    Iron and TIBC    Vitamin B12   3. Impacted cerumen of left ear     4. Screening, lipid  Lipid Panel             Plan:      Return if symptoms worsen or fail to improve. Hand out for home epley manuvers to start  Symptoms presently resolved but she can use these intermittently as needed  Fatigue recently. Patient is sexually active. Urine preg test negative  Update labs, will check fasting lipids as well  Ear flushed and patient tolerated it well  Cont with neurosurgery for neck pain  Patient agreed with treatment plan. She will contact us if wanting to do anything further for work accommodations    Orders Placed This Encounter   Procedures    Comprehensive Metabolic Panel     Standing Status:   Future     Standing Expiration Date:   11/9/2022    Lipid Panel     Standing Status:   Future     Standing Expiration Date:   11/9/2022     Order Specific Question:   Is Patient Fasting?/# of Hours     Answer:   yes    CBC Auto Differential     Standing Status:   Future     Standing Expiration Date:   11/9/2022    TSH with Reflex     Standing Status:   Future     Standing Expiration Date:   11/9/2022    Iron and TIBC     Standing Status:   Future     Standing Expiration Date:   11/9/2022     Order Specific Question:   Is Patient Fasting? Answer:   yes     Order Specific Question:   No of Hours? Answer:   10    Vitamin B12     Standing Status:   Future     Standing Expiration Date:   11/9/2022    POCT urine pregnancy     Results for orders placed or performed in visit on 11/09/21   POCT urine pregnancy   Result Value Ref Range    Preg Test, Ur Negative     Control Positive          Patient given educational materials - see patient instructions. Discussed use, benefit, and side effects of prescribed medications. All patientquestions answered. Pt voiced understanding. Reviewed health maintenance. Instructedto continue current medications, diet and exercise. Patient agreed with treatmentplan. Follow up as directed.      Electronically signed by Cleveland Nazario PA-C on 11/9/2021 at 10:26 AM

## 2021-11-10 ENCOUNTER — HOSPITAL ENCOUNTER (OUTPATIENT)
Dept: PHYSICAL THERAPY | Facility: CLINIC | Age: 31
Setting detail: THERAPIES SERIES
Discharge: HOME | End: 2021-11-10
Payer: MEDICARE

## 2021-11-10 LAB — VITAMIN B-12: 295 PG/ML (ref 232–1245)

## 2021-11-10 PROCEDURE — 97530 THERAPEUTIC ACTIVITIES: CPT

## 2021-11-10 PROCEDURE — 97162 PT EVAL MOD COMPLEX 30 MIN: CPT

## 2021-11-11 NOTE — CONSULTS
[] [facility]  [address]  [phone]  F: [phone] [] [address]  [address] [facility]   [address]  P: [phone]  F: [phone] [] [place]  [address] [facility] [address]  P: [phone]  F: [phone] [x] [address]  P: [phone]  F: [phone] [] [address]  [facility]   [address]   [place]: [phone]  F: [phone]      Physical Therapy Spine Evaluation    Date:  2021  Patient: Chadwick Cancer  : 1990  MRN: [id number]  Physician: BERNARDO Garcia    Insurance: [employer] [employer]  Medical Diagnosis: Cervical Spondylosis with Radiculopathy    Rehab Codes: M47.22  Onset Date: 3/01/2021  Next 's appt. : 2022    Subjective:   CC: Neck pain with bilateral arm pain  HPI: Patient has a 7-8 month history of low neck pain with variable shoulder pain and radicular symptoms bilaterally. Her arm pain in variable in intensity and seems to be increased by prolonged sitting. She has not had PT in the past for her neck pain. PMHx: [] Unremarkable [] Diabetes [] HTN  [] Pacemaker   [] MI/Heart Problems [] Cancer [] Arthritis [] Other:              [x] Refer to full medical chart  In EPIC       Comorbidities:   [] Obesity [] Dialysis  [x] N/A   [] Asthma/COPD [] Dementia [] Other:   [] Stroke [] Sleep apnea [] Other:   [] Vascular disease [] Rheumatic disease [] Other:     Tests: [] X-Ray: [] MRI:  [] Other:  Not within the [facility] system. Patient reports multilevel spurring. Medications: [x] Refer to full medical record [] None [] Other:  Allergies:      [x] Refer to full medical record [] None [] Other:    Function:  Hand Dominance  [x] Right  [] Left  Patient lives with:     In what type of home []  One story   [] Two story   [] Split level   Number of stairs to enter    With handrail on the []  Right to enter   [] Left to enter   Bathroom has a []  Tub only  [] Tub/shower combo   [] Walk in shower    []  Grab bars   Washing machine is on []  Main level   [] Second level   [] Basement   Employer    Job Status []  Normal duty   [] Light duty   [] Off due to condition    []  Retired   [] Not employed   [] Disability  [] Other:  []  Return to work:    Work activities/duties Jeanette, Grecia Hernadez,        ADL/IADL Previous level of function Current level of function Who currently assists the patient with task   Bathing  [] Independent  [] Assist [x] Independent  [] Assist    Dress/grooming [] Independent  [] Assist [x] Independent  [] Assist    Transfer/mobility [] Independent  [] Assist [x] Independent  [] Assist    Feeding [] Independent  [] Assist [x] Independent  [] Assist    Toileting [] Independent  [] Assist [x] Independent  [] Assist    Driving [] Independent  [] Assist [x] Independent  [] Assist    Housekeeping [] Independent  [] Assist [x] Independent  [] Assist    Grocery shop/meal prep [] Independent  [] Assist [x] Independent  [] Assist      Pain:  [x] Yes  [] No Location: neck pain Pain Rating: (0-10 scale) 6/10  Pain altered Tx:  [] Yes  [x] No  Action:    Symptoms:  [] Improving [] Worsening [x] Same  Better:  [x] AM    [] PM    [] Sit    [] Rise/Sit    []Stand    [] Walk    [] Lying    [] Other:  Worse: [] AM    [x] PM    [] Sit    [] Rise/Sit    []Stand    [] Walk    [] Lying    [] Bend                      [] Valsalva    [] Other:  Sleep: [] OK    [x] Disturbed    Objective:      STRENGTH  STRENGTH  ROM    Left Right  Left Right Cervical    C5 Shld Abd 5 5 L1-2 Hip Flex   Flexion 100%   Shld Flexion   Hip Abd   Extension 100%   Shld IR   L3-4 Knee Ext   Rotation L 100% R 100%   Shld ER   L4 Ankle DF   Sidebend L 100% R 100%   C6 Elb Flex 5 5 L5 EHL   Retraction    C7 Elb Ext 5 5 S1 Plant. Flex   Lumbar    C8 EPL 5 5 Abdominals   Flexion    T1 Fing Abd 5 5 Erector Spinae   Extension          Rotation L  R         Sidebend L R         UE/LE                                                              TESTS (+/-) LEFT RIGHT Not Tested   SLR [] sit [] supine   [x]   Hamstring (SLR)   [x]   SKTC   [x]   DKTC   [x]   Slump/Dural   [x]   SI JT   [x]   LEONA   [x]   Joint Mobility WNL WNL [x]   Cerv. Comp + + []   Cerv. Distraction + + []   Cerv. Tuck and lift +8 sec  []   Vertebral Artery   [x]   Adsons   [x]   Padilla Sabino   [x]   Agustin Tests ? Pain ? Pain No Change Not Tested   RFIS [x] [] [] []   KENDY [x] [] [] []   RFIL [] [] [] []   REIL [] [] [] []   Rep Prot [] [] [] []   Rep Retract [] [] [] []       OBSERVATION No Deficit Deficit Not Tested Comments   Posture       Forward Head [] [x] []    Rounded Shoulders [] [x] []    Kyphosis [] [] []    Lordosis [] [] []    Lateral Shift [] [] []    Scoliosis [] [] []    Iliac Crest [] [] []    PSIS [] [] []    ASIS [] [] []    Genu Valgus [] [] []    Genu Varus [] [] []    Genu Recurvatum [] [] []    Pronation [] [] []    Supination [] [] []    Leg Length Discrp [] [] []    Slumped Sitting [] [] []    Palpation [] [x] []    Sensation [] [] []    Edema [] [] []    Neurological [] [] []        Functional Test: NDI Score: 23% functionally impaired       Assessment:  The patient is a 32year old with a chief complaint of neck pain and signs and symptoms consistent with a diagnosis of cervical spondylosis with radiculopathy. She normal cervical ROM and joint mobility but presents with pain, cervical weakness and functional limitations. She has variable non dermatomal paresthesia and arm pain. She is a good PT candidate to address above mentioned deficits. Problems:    [x] ? Pain:  [] ? ROM:  [x] ? Strength:  [x] ? Function:  [] Other:      STG: (to be met in 6 treatments)  1. ? Pain: 4/10  2.  Patient to be independent with home exercise program as demonstrated by performance with correct form without cues. LTG: (to be met in 16 treatments)  1. Patient will improve NDI score by 10%  2. Patient will be independent in self management strategies for neck pain      Patient goals: Reduce neck pain    Rehab Potential:  [x] Good  [] Fair  [] Poor   Suggested Professional Referral:  [x] No  [] Yes:  Barriers to Goal Achievement:  [x] No  [] Yes:  Domestic Concerns:  [x] No  [] Yes:    Pt. Education:  [x] Plans/Goals, Risks/Benefits discussed  [] Home exercise program  Method of Education: [x] Verbal  [] Demo  [] Written  Comprehension of Education:  [x] Verbalizes understanding. [] Demonstrates understanding. [x] Needs Review. [] Demonstrates/verbalizes understanding of HEP/Ed previously given. Treatment Plan:  [x] Therapeutic Exercise   39053  [] Iontophoresis: 4 mg/mL Dexamethasone Sodium Phosphate  mAmin  58301   [] Therapeutic Activity  54869 [] Vasopneumatic cold with compression  61383    [] Gait Training   50317 [] Ultrasound   22971   [x] Neuromuscular Re-education  92177 [] Electrical Stimulation Unattended  03359   [x] Manual Therapy  68030 [] Electrical Stimulation Attended  37489   [x] Instruction in HEP  [x] Cervical Traction  74169   [] Aquatic Therapy   00609 [] Cold/hotpack    [] Massage   49710      [x] Dry Needling, 1 or 2 muscles  00222   [] Biofeedback, first 15 minutes   68523  [] Biofeedback, additional 15 minutes   49064 [] Dry Needling, 3 or more muscles  14580       Frequency:  2 x/week for 16 visits    Todays Treatment:  Modalities: PRN  Precautions:  Exercises:  Exercise Reps/ Time Weight/ Level Comments                                 Other:  Patient education on diagnosis, prognosis and plan of care. Specific Instructions for next treatment: Manual therapy including MFR and manual traction. Scapula and cervical strengthening.   If manual traction is well tolerated will consider mechanical traction. Evaluation Complexity:  History (Personal factors, comorbidities) [] 0 [x] 1-2 [] 3+   Exam (limitations, restrictions) [] 1-2 [x] 3 [] 4+   Clinical presentation (progression) [] Stable [x] Evolving  [] Unstable   Decision Making [] Low [x] Moderate [] High    [] Low Complexity [] Moderate Complexity [] High Complexity       Treatment Charges: Mins Units   [] Evaluation       []  Low       [x]  Moderate       []  High 25 1   []  Modalities     []  Ther Exercise     [x]  Manual Therapy 5 -   [x]  Ther Activities 15 1   []  Aquatics     []  Vasocompression     []  Other       TOTAL TREATMENT TIME: 45    Time in: 6:15pm     Time out: 7:00pm    Electronically signed by: Cruzito Hernandez PT        Physician Signature:________________________________Date:__________________  By signing above or cosigning this note, I have reviewed this plan of care and certify a need for medically necessary rehabilitation services.      *PLEASE SIGN ABOVE AND FAX BACK ALL PAGES*

## 2021-11-17 ENCOUNTER — HOSPITAL ENCOUNTER (OUTPATIENT)
Dept: PHYSICAL THERAPY | Facility: CLINIC | Age: 31
Setting detail: THERAPIES SERIES
Discharge: HOME | End: 2021-11-17
Payer: MEDICARE

## 2021-11-17 PROCEDURE — 97140 MANUAL THERAPY 1/> REGIONS: CPT

## 2021-11-17 PROCEDURE — 97530 THERAPEUTIC ACTIVITIES: CPT

## 2021-11-17 NOTE — FLOWSHEET NOTE
[] [facility]  [address]  [phone]  F: [phone] [] [address]  [facility]   [address]  P: [phone]  F: [phone] [] [address]  [address]  P: [phone]  F: [phone] [x] [address]  P: [phone]  F: [phone] [] [address]  [facility]   [address]   [place]: [phone]  F: [phone]      Physical Therapy Daily Treatment Note    Date:  2021  Patient Name:  Pk Sport    :  1990  MRN: [id number]   Physician: BERNARDO Bautista                       Insurance: [place] [employer]  Medical Diagnosis: Cervical Spondylosis with Radiculopathy                      Rehab Codes: M47.22  Onset Date: 3/01/2021                      Next 's appt. : 2022     Visit# / total visits:  (2x wk)        Progress note for Medicare patient due at visit      Cancels/No Shows:     Subjective:    Pain:  [x] Yes  [] No Location: neck pain     Pain Rating: (0-10 scale) 6/10  Pain altered Tx:  [x] No  [] Yes  Action:  Comments: Pt stated is in pain on arrival due to \"over doing it\" at the gym yesterday. She stated her cervical and jacques shoulder are painful. Objective:  Modalities:   Precautions: Intermittent vertigo   Exercises:  Exercise Reps/ Time Weight/ Level  Comments HEP   Supine chin tucks 10x3\"  x     Seated chin retractions 10x3\"  x  x   Shoulder rolls 20x   x  x   Shoulder retraction 10x5\"  x  x   Cervical ROM L<>R 10x  x No flexion/extension d/t increase in vertigo  x                                   Other:  Patient education on diagnosis, prognosis and plan of care.   Manual: cervical traction 15x10\" in neutral, gentle OCR, gentle MFR jacques UT/levator, manual UT stretch.      Specific Instructions for next treatment: Manual therapy including MFR and manual traction. Scapula and cervical strengthening. If manual traction is well tolerated will consider mechanical traction. Treatment Charges: Mins Units   []  Modalities     [x]  Ther Exercise 15 1   [x]  Manual Therapy 20 1   []  Ther Activities     []  Aquatics     []  Vasocompression     []  Other     Total Treatment time 35 2       Assessment: [x] Progressing toward goals. Initiated with manual to decrease noted tension secondary to increase pain of arrival. Pt tolerated 15x10\" of cervical traction with fair elise but requires light tension. Pt demo best tolerance and result with traction in neutral vs 20-30 degrees to centralize C-5/C-6. Pt demo increased sensitivity with OCR but could tolerate light pressure. Followed manual with ex regiment as listed above with min complaint of discomfort with chin tucks and scapula retraction. Demo and cues for proper mid traps and rhomboid activation without UT assist. Attempted gentle AROM cervical extension and flexion but discontinued d/t complaints of min dizziness and statement that she has intermittent vertigo. Pt was ed on ergonomics to decrease cervical flexion during work and home studies. Prior to leaving clinic, pt demo LOB and d/t an increase in dizziness. Attempted to have pt sit down but she stated \" No I'm fine, I'm use to it\" and continued to demo unsteady gait. Pt was amb through clinic and to her car as CGA by PTA. Pt continued to state that she was fine as she left the premises. [] No change. [] Other:  [x] Patient would continue to benefit from skilled physical therapy services in order to decrease muscle tension, improve strength and ROM. STG: (to be met in 6 treatments)  1. ? Pain: 4/10  2.  Patient to be independent with home exercise program as demonstrated by performance with correct form without cues.     LTG: (to be met in 16 treatments)  1. Patient will improve NDI score by 10%  2. Patient will be independent in self management strategies for neck pain        Patient goals: Reduce neck pain    Pt. Education:  [x] Yes  [] No  [] Reviewed Prior HEP/Ed  Method of Education: [x] Verbal  [] Demo  [] Written  Comprehension of Education:  [x] Verbalizes understanding. [] Demonstrates understanding. [x] Needs review. [] Demonstrates/verbalizes HEP/Ed previously given. Plan: [x] Continue current frequency toward long and short term goals.     [x] Specific Instructions for subsequent treatments:       Time In: 4:02            Time Out: 4:50    Electronically signed by:  Tete Tipton PTA

## 2021-11-22 ENCOUNTER — HOSPITAL ENCOUNTER (OUTPATIENT)
Dept: PHYSICAL THERAPY | Facility: CLINIC | Age: 31
Setting detail: THERAPIES SERIES
Discharge: HOME | End: 2021-11-22
Payer: MEDICARE

## 2021-11-22 PROCEDURE — 97110 THERAPEUTIC EXERCISES: CPT

## 2021-11-22 PROCEDURE — 97140 MANUAL THERAPY 1/> REGIONS: CPT

## 2021-11-22 NOTE — FLOWSHEET NOTE
[] [facility]  [address]  [phone]  F: [phone] [] [address]  [facility] 36   [address]  P: [phone]  F: [phone] [] [place]  [address]  P: [phone]  F: [phone] [x] [address]  P: [phone]  F: [phone] [] [address]  [facility]   [address]   [place]: [phone]  F: [phone]      Physical Therapy Daily Treatment Note    Date:  2021  Patient Name:  Isac Flores    :  1990  MRN: [id number]   Physician: BERNARDO Pastrana                       Insurance: [employer]  Medical Diagnosis: Cervical Spondylosis with Radiculopathy                      Rehab Codes: M47.22  Onset Date: 3/01/2021                      Next 's appt. : 2022     Visit# / total visits:  (2x wk)        Progress note for Medicare patient due at visit      Cancels/No Shows:     Subjective:    Pain:  [x] Yes  [] No Location: neck pain     Pain Rating: (0-10 scale) 5/10  Pain altered Tx:  [x] No  [] Yes  Action:  Comments: Pt stated that she had improved pain for about an hour after last session. She reports increasing dizziness associated with rapid change of head position. She has had this for years but notices worse symptoms over the last several months.      Objective:  Modalities:   Precautions: Intermittent vertigo   Exercises:  Exercise Reps/ Time Weight/ Level  Comments HEP   Supine chin tucks 10x3\"  x     Seated chin retractions 10x3\"  x  x   Shoulder rolls 20x     x   Shoulder retraction 10x5\"  x  x   Cervical ROM L<>R 10x   No flexion/extension d/t increase in vertigo  x   Band row 2x12 Green  x     Ball A 2x5  x     Ball W 2x5  x     Ball T 2x5  x     Other:  Patient education on diagnosis, prognosis and plan of care. Manual: cervical traction 15x10\" in neutral, gentle OCR, gentle MFR jacques UT/levator, manual UT stretch.      Specific Instructions for next treatment: Manual therapy including MFR and manual traction. Scapula and cervical strengthening. If manual traction is well tolerated will consider mechanical traction. Treatment Charges: Mins Units   []  Modalities     [x]  Ther Exercise 30 2   [x]  Manual Therapy 25 2   []  Ther Activities     []  Aquatics     []  Vasocompression     []  Other     Total Treatment time 55 4       Assessment: [x] Progressing toward goals. Initiated with manual therapy in prone followed by supine manual traction and SOR. Patient has had increased dizziness with head movements. A [facility] pike on the right revealed a nystagmus and provoked symptoms. She tolerated cervical and scapular strengthening. Prone positioning didn't increase symptoms. Cervical AROM was not performed as this was likey a trigger for her dizziness last session. She was scheduled for 1 visit with Jason Lnog PT to further evaluate dizziness symptoms. [] No change. [] Other:  [x] Patient would continue to benefit from skilled physical therapy services in order to decrease muscle tension, improve strength and ROM. STG: (to be met in 6 treatments)  1. ? Pain: 4/10  2. Patient to be independent with home exercise program as demonstrated by performance with correct form without cues.     LTG: (to be met in 16 treatments)  1. Patient will improve NDI score by 10%  2. Patient will be independent in self management strategies for neck pain        Patient goals: Reduce neck pain    Pt. Education:  [x] Yes  [] No  [] Reviewed Prior HEP/Ed  Method of Education: [x] Verbal  [] Demo  [] Written  Comprehension of Education:  [x] Verbalizes understanding. [] Demonstrates understanding. [x] Needs review.   [] Demonstrates/verbalizes HEP/Ed previously given. Plan: [x] Continue current frequency toward long and short term goals.     [x] Specific Instructions for subsequent treatments:       Time In: 3:10PM            Time Out: 4:07PM    Electronically signed by:  Neha Peguero PT

## 2021-11-29 ENCOUNTER — HOSPITAL ENCOUNTER (OUTPATIENT)
Dept: PHYSICAL THERAPY | Facility: CLINIC | Age: 31
Setting detail: THERAPIES SERIES
Discharge: HOME | End: 2021-11-29
Payer: MEDICARE

## 2021-11-29 PROCEDURE — 97110 THERAPEUTIC EXERCISES: CPT

## 2021-11-29 PROCEDURE — 97140 MANUAL THERAPY 1/> REGIONS: CPT

## 2021-11-29 NOTE — FLOWSHEET NOTE
[] [facility] [address] [facility]  [address]  [phone]  F: [phone] [] [address]  [facility] 36   [address]  P: [phone]  F: [phone] [] [address]  [facility]  [address] [facility]  P: [phone]  F: [phone] [x] [address]  P: [phone]  F: [phone] [] [address]  [facility]   [address]   [place]: [phone]  F: [phone]      Physical Therapy Daily Treatment Note    Date:  2021  Patient Name:  Riley Salcedo    :  1990  MRN: [id number]   Physician: BERNARDO Garcia                       Insurance: [place] [employer]age  Medical Diagnosis: Cervical Spondylosis with Radiculopathy                      Rehab Codes: M47.22  Onset Date: 3/01/2021                      Next 's appt. : 2022     Visit# / total visits:  (2x wk)        Progress note for Medicare patient due at visit      Cancels/No Shows:     Subjective:    Pain:  [x] Yes  [] No Location: neck pain     Pain Rating: (0-10 scale) 5/10  Pain altered Tx:  [x] No  [] Yes  Action:  Comments: Pt reports having an intense vertigo incident on 21 where she was so dizzy that she fell to the ground and her coworkers called 911. Pt states that she refused to go to ER and was able to drive herself home later. Pt reports she has been having more frequent episodes of dizziness, but Friday was the most severe that she has had.      Objective:  Modalities:   Precautions: Intermittent vertigo   Exercises:  Exercise Reps/ Time Weight/ Level  Comments HEP   Supine chin tucks 10x3\"  -     Seated chin retractions 2x10x3\"  x  x   Shoulder rolls 20x     x   Shoulder retraction 10x5\"  x  x   Cervical ROM L<>R 10x   No flexion/extension d/t increase in vertigo  x   Band row 2x10 L[place]   x     Ball A 2x5  -     Ball W 2x5  -     Ball T 2x5  -     Other:  Patient education on diagnosis, prognosis and plan of care. Manual: cervical traction 15x10\" in neutral, gentle OCR, gentle MFR jacques UT/levator, manual UT stretch.      Specific Instructions for next treatment: Manual therapy including MFR and manual traction. Scapula and cervical strengthening. If manual traction is well tolerated will consider mechanical traction. Treatment Charges: Mins Units   []  Modalities     [x]  Ther Exercise 15 1   [x]  Manual Therapy 25 2   []  Ther Activities     []  Aquatics     []  Vasocompression     []  Other     Total Treatment time 40 3       Assessment: [] Progressing toward goals. [] No change. [x] Other: Initiated tx with SOR followed by manual cervical distraction with positive relief reported after. Pt then performed seated scap retract followed by cervical retractions with good elise. Pt then attempted Scapular strengthening exercises in prone position on hospitals with an episode of significant dizziness; [facility] and sat on the ground with assistance from PTA. Pt was able to sit in the chair where she was noted to sway initially but after approx 5' pt sttaed that she felt better. PTA offered to call someone to pick her up, by pt refused. Pt is scheduled tomorrow with Debora Raya, PT to further evaluate dizziness symptoms. [x] Patient would continue to benefit from skilled physical therapy services in order to decrease muscle tension, improve strength and ROM. STG: (to be met in 6 treatments)  1. ? Pain: 4/10  2. Patient to be independent with home exercise program as demonstrated by performance with correct form without cues.     LTG: (to be met in 16 treatments)  1. Patient will improve NDI score by 10%  2.  Patient will be independent in self management strategies for neck pain        Patient goals: Reduce neck pain    Pt. Education:  [x] Yes  [] No  [] Reviewed Prior HEP/Ed  Method of Education: [x] Verbal  [] Demo  [] Written  Comprehension of Education:  [x] Verbalizes understanding. [] Demonstrates understanding. [x] Needs review. [] Demonstrates/verbalizes HEP/Ed previously given. Plan: [x] Continue current frequency toward long and short term goals.     [x] Specific Instructions for subsequent treatments:       Time In: 4:10PM            Time Out: 5:00pm    Electronically signed by:  Tobin Kim PTA

## 2021-11-30 ENCOUNTER — HOSPITAL ENCOUNTER (OUTPATIENT)
Dept: PHYSICAL THERAPY | Facility: CLINIC | Age: 31
Setting detail: THERAPIES SERIES
Discharge: HOME | End: 2021-11-30
Payer: MEDICARE

## 2021-11-30 PROCEDURE — 97110 THERAPEUTIC EXERCISES: CPT

## 2021-11-30 PROCEDURE — 97530 THERAPEUTIC ACTIVITIES: CPT

## 2021-11-30 NOTE — FLOWSHEET NOTE
[] [facility]  [address]  [phone]  F: [phone] [] [address]  [facility]   [address]  P: [phone]  F: [phone] [] [address] &  Therapy  [address] [facility]  P: [phone]  F: [phone] [x] [address]  P: [phone]  F: [phone] [] [address]  [facility]   [address]   [place]: [phone]  F: [phone]      Physical Therapy Daily Treatment Note    Date:  2021  Patient Name:  Dylon Mendez    :  1990  MRN: [id number]   Physician: BERNARDO Millard                       Insurance: [employer]  Medical Diagnosis: Cervical Spondylosis with Radiculopathy                      Rehab Codes: M47.22  Onset Date: 3/01/2021                      Next 's appt. : 2022  Visit# / total visits: /16 (2x wk)     Cancels/No Shows: 0/0    Subjective:    Pain:  [x] Yes  [] No Location: neck pain     Pain Rating: (0-10 scale) 5/10  Pain altered Tx:  [] No  [x] Yes  Action: SEE BELOW  Comments: SEE BELOW    Objective:  Modalities:   Precautions: Intermittent vertigo   Exercises:  Exercise Reps/ Time Weight/ Level  Comments HEP   VESTIBULAR SCREEN, EDUCATION    COMPLETED- 2021   Potential cervicogenic dizziness VS motion sensitivity; may benefit from treatment of cervicogenic dizziness- including neutral strengthening and proprioceptive interventions, though may not change her symptoms- verbalized understanding to all. x   Supine chin tucks 10x3\"  -  -   Seated chin retractions 2x10x3\"  x  -   Shoulder rolls 20x     -   Shoulder retraction 10x5\"  x  -   Cervical ROM L<>R 10x   No flexion/extension d/t increase in vertigo  -   Band row 2x10 Lime   x     Ball A 2x5  - Ashlie Shaw 2x5  -     Ball T 2x5  -     Other:    Manual: cervical traction 15x10\" in neutral, gentle OCR, gentle MFR jacques UT/levator, manual UT stretch- HELD 11/30/2021      Vestibular screen- 11/30/2021:    DHI is 50/100    Chronic- \"dizzy my whole life\"; however- worsening over the past x6-7 months without specific JEAN-PAUL or aggravating factors. Cervical spine pain began at this time as well. Worse: head movement; bending over  Better: laying down- \"ground myself and rest\"  Denies issues with bed mobility. Symptoms can range from a few minutes to a few hours or a day. \"Depends on the day; [place] on [facility]; world falling out from under me; nausea. \" \"Sometimes LH as well. \" Also describes as spinning. \"Fallen a few times while at work [place] to onset of symptoms]. \"   Denies h/o motion sensitivity. Denies assessment by neurologist, ENT, previous vestibular PT services. Denies current medications. Denies changes in hearing, ringing within either ear. Intermittent B hand N/T, lack of hand coordination as of recent. H/o potential hyper-flexibility/ligament laxity. Red flags & Special Tests:     Test Result   Vertebral Artery N; slight change in dizziness B- but no other symptoms    Alar Ligament N   Transverse Ligament N     Immediately drops head post alar, transverse ligament testing- reports inc symptoms- similar to those experienced as of recent. Cervical spine AROM:    Motion Goniometric Measurement Pain/Symptoms   Flexion WNL    Extension WNL    R, L rotation 90%- B    R, L SB 90%- B      Cervical spine strength: Motion MMT Grade   Flexion WNL   Extension WNL   R, L rotation WNL   R, L SB WNL     Oculomotor Tests- With Fixation (Open Vision):    Dons glasses for near-sighted.      Test Result- Normal, Abnormal (R) (L) Upward Downward Description   Spontaneous Nystagmus N        Gaze Holding Nystagmus N        Smooth Pursuit N        Ocular ROM N        Saccades N Convergence    N/A N/A -   VOR Slow N N/A N/A N/A N/A    VOR Cancellation N N/A N/A N/A N/A    Head Thrust N   N/A N/A    Static Visual Acuity  N/A N/A N/A N/A -   Dynamic Visual Acuity WNL    >3 Line difference    Muscle Guard N/A N/A N/A N/A -   Valsalva N          Oculomotor Tests- Without Fixation (Vision Blocked): NOT APPROPRIATE FOR TODAY    Test Result Description   Spontaneous Nystagmus     Gaze Hold Nystagmus     Valsalva     Head-Shaking Nystagmus       Cervicogenic Dizziness Tests:    Smooth Pursuit Neck Torsion    Test Position Pain/Symptoms Presence of Catch-up Saccades   (R) Symptoms only  NONE   (L) Symptoms only  NONE     Similar intensity B. Chair Rotation Test    Test Position Result Eyes open- presence of nystagmus  Eyes closed  Indication   Condition #1- Cervical Rotation (Bias Cervical) N None  x    Condition #2- Full Body Rotation (Bias Vestibular System) A None  x Greater vestibular effect VS cervical    Requires MIN A PT in order to prevent LOB, fall      Vibration Test- NEXT VISIT    Muscle Result   R UT/ L UT       /   R LS/ L LS       /   R PS/ L PS       /   R JOSSY/ L JOSSY       /   R SCM/ L SCM       /     Cervical Kinesthetic Sense Test [pt seated 3ft from target]     Motion Trial 1 Trial 2 Trial 3 Jerky movement Over-shooting Searching for target BEST SCORE [abnormal is outside of first, 2 rings]   R rotation A 2.0 6.0    2.0   L rotation 6.0 A 6.0    6.0   Flexion 3.0 A 3.0    3.0   Extension A A A  x  A     Treatment Charges: Mins Units   [x]  Modalities- CP 10 -   [x]  Ther Exercise 15 1   []  Manual Therapy     [x]  Ther Activities 40 3   []  Aquatics     []  Vasocompression     []  Other     Total Treatment time 65 4     Assessment: [] Progressing toward goals. [] No change. [x] Other: Pt continues to complain of dizziness, and therefore, performed vestibular screen today. Results as detailed above- potential cervicogenic dizziness VS motion sensitivity.  May benefit from treatment of cervicogenic dizziness- including neutral strengthening and proprioceptive interventions, though may not change her symptoms- verbalized understanding to all. Will consider ideas listed below next visit. [x] Patient would continue to benefit from skilled physical therapy services in order to decrease muscle tension, improve strength and ROM. STG: (to be met in 6 treatments)  1. ? Pain: 4/10  2. Patient to be independent with home exercise program as demonstrated by performance with correct form without cues.     LTG: (to be met in 16 treatments)  1. Patient will improve NDI score by 10%  2. Patient will be independent in self management strategies for neck pain      Patient goals: Reduce neck pain    Pt. Education:  [x] Yes  [] No  [] Reviewed Prior HEP/Ed  Method of Education: [x] Verbal  [] Demo  [] Written  Comprehension of Education:  [x] Verbalizes understanding. [] Demonstrates understanding. [] Needs review. [] Demonstrates/verbalizes HEP/Ed previously given. 11/30/2021- See grid above for details. Plan: [x] Continue current frequency toward long and short term goals. [x] Specific Instructions for subsequent treatments: Perform positional tests- treat if appropriate; vibration test; resume neutral cervical spine and postural strengthening interventions; and begin seated neck motion control activities next visit.        Time In: 2PM           Time Out: 3:05PM    Electronically signed by:  Ketty Torres PT

## 2021-12-01 ENCOUNTER — APPOINTMENT (OUTPATIENT)
Dept: PHYSICAL THERAPY | Facility: CLINIC | Age: 31
End: 2021-12-01
Payer: MEDICARE

## 2021-12-06 ENCOUNTER — HOSPITAL ENCOUNTER (OUTPATIENT)
Dept: PHYSICAL THERAPY | Facility: CLINIC | Age: 31
Setting detail: THERAPIES SERIES
Discharge: HOME | End: 2021-12-06
Payer: MEDICARE

## 2021-12-06 ENCOUNTER — APPOINTMENT (OUTPATIENT)
Dept: PHYSICAL THERAPY | Facility: CLINIC | Age: 31
End: 2021-12-06
Payer: MEDICARE

## 2021-12-06 NOTE — FLOWSHEET NOTE
[] [facility]) - [facility] &  Therapy  [address]    [phone]  F: [phone]   [] [address] [facility]   [address]  P: [phone]  F: [phone]  [] [place]  [address] [facility] [address]  P: [phone]  F: [phone] [x] [address] [employer] [address]  P: [phone]  F: [phone]  [] [address]  [facility]   [address]   [place]: [phone]  F: [phone]   [] [place] [address] [facility] [address]  [place]: [phone]   F: [phone]     Physical Therapy Cancel/No Show note    Date: 2021  Patient: David Dupont  : 1990  MRN: [id number]    Cancels/No Shows to date: 0/3    For today's appointment patient:    []  Cancelled    [] Rescheduled appointment    [x] No-show     Reason given by patient:    []  Patient ill    []  Conflicting appointment    [] No transportation      [] Conflict with work    [] No reason given    [] Weather related    [] COVID-19    [x] Other:      Comments: Telephoned pt regarding missed treatment appointment today at [address], and pt stated that she forgot. Re-scheduled for next Monday and Tuesday- verbalized understanding and agreement.        [x] Next appointment was confirmed    Electronically signed by: Caleb Jason, PT

## 2021-12-13 ENCOUNTER — HOSPITAL ENCOUNTER (OUTPATIENT)
Dept: PHYSICAL THERAPY | Facility: CLINIC | Age: 31
Setting detail: THERAPIES SERIES
End: 2021-12-13
Payer: MEDICARE

## 2021-12-14 ENCOUNTER — HOSPITAL ENCOUNTER (OUTPATIENT)
Dept: PHYSICAL THERAPY | Facility: CLINIC | Age: 31
Setting detail: THERAPIES SERIES
Discharge: HOME | End: 2021-12-14
Payer: MEDICARE

## 2021-12-14 PROCEDURE — 97530 THERAPEUTIC ACTIVITIES: CPT

## 2021-12-14 NOTE — FLOWSHEET NOTE
[] [facility]  [address]  [phone]  F: [phone] [] [address]  [facility] 36   [address]  P: [phone]  F: [phone] [] [address]  Therapy  [address] [facility]  P: [phone]  F: [phone] [x] [address]  P: [phone]  F: [phone] [] [address]  [facility]   [address]   [place]: [phone]  F: [phone]      Physical Therapy Daily Treatment Note    Date:  2021  Patient Name:  Adele Gomez    :  1990  MRN: [id number]   Physician: BERNARDO Grant                       Insurance: [employer]  Medical Diagnosis: Cervical Spondylosis with Radiculopathy                      Rehab Codes: M47.22  Onset Date: 3/01/2021                      Next 's appt. : 2022- PENDING SOONER   Visit# / total visits:  (2x wk)     Cancels/No Shows: 0/3    Subjective:    Pain:  [x] Yes  [] No Location: neck pain- B JOSSY     Pain Rating: (0-10 scale) 2/10  Pain altered Tx:  [] No  [x] Yes  Action: SEE BELOW  Comments:      Reports experiencing an additional, intense vertigo episode while at work over this past week. \"Fell to the ground; couldn't tell what was up; lost control of my limbs; some nausea; went to bed and passed out. 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 Symptoms remained for x4 hours. No relief with current cervical spine HEP interventions- \"if anything my vertigo is getting worse. \"  Unable to visit urgent care, ED secondary to financial concerns. Unable to follow-up with PCP until March.    No longer utilizing anti-dizziness medication- declines secondary to \"probably the in blood pressure/POTS. Pt may benefit from tilt table test, with potential medical management, if deemed appropriate by referring MD- and also if pt consents. Otherwise, pt reports worse cervical spine pain and dizziness with PT services thus far. Therefore, held all treatment today. Finished with goal update and re-assessment of NDI for discharge note to referring MD- results as detailed above and below- no goals met. Instructed as follows: D/C all current cervical spine HEP interventions; telephone referring MD- requires a sooner follow-up; may benefit from a tilt table test, with medical management, if deemed appropriate by MD; discharge today from PT services- verbalized understanding to all. No further PT services recommended at this time. [] Patient would continue to benefit from skilled physical therapy services in order to decrease muscle tension, improve strength and ROM. STG: (to be met in 6 treatments)  1. ? Pain: 4/10- NOT MET- 3-9/10 over this past week   2. Patient to be independent with home exercise program as demonstrated by performance with correct form without cues. - UNCLEAR     LTG: (to be met in 16 treatments)  1. Patient will improve NDI score by 10%- NOT MET  2. Patient will be independent in self management strategies for neck pain- NOT MET      Patient goals: Reduce neck pain- NOT MET    Pt. Education:  [x] Yes  [] No  [x] Reviewed Prior HEP/Ed  Method of Education: [] Verbal  [] Demo  [] Written  Comprehension of Education:  [] Verbalizes understanding. [] Demonstrates understanding. [] Needs review. [x] Demonstrates/verbalizes HEP/Ed previously given. 11/30/2021- See grid above for details. 12/14/2021- See grid above for details. Plan: [x] Continue current frequency toward long and short term goals. [x] Specific Instructions for subsequent treatments: Discharge today from PT services.        Time In: 3:04PM           Time Out: 3:35PM    Electronically signed by:  Anna Marie Saucedo Farshad Merck

## 2021-12-14 NOTE — DISCHARGE SUMMARY
[] [facility]) - [facility] &  Therapy  [address]  [phone]  F: [phone] [] [address]  [facility]   [address]  P: [phone]  F: [phone] [] [address]  [address] [facility] [address] [address]  P: [phone]  F: [phone] [x] [address]  P: [phone]  F: [phone] [] [address]  [address] [facility]   [address]   [place]: [phone]  F: [phone]      Physical Therapy Discharge Note    Date: 2021      Patient: Charan Cantu  : 1990  MRN: [id number]    [facility], APRN-CNP                       [facility] [facility]                      Rehab Codes: M47.22  Onset Date: 3/01/2021                      Next 's appt. : 2022- PENDING SOONER   Visit# / total visits: / (2x wk)                                 Cancels/No Shows: 0/3  Date of initial visit: 11/10/2021                Date of final visit: 2021     Subjective:  Pain:  [x]? Yes  []? No   Location: neck pain- B JOSSY             Pain Rating: (0-10 scale) 2/10  Pain altered Tx:  []? No  [x]? Yes  Action: SEE BELOW  Comments:       Reports experiencing an additional, intense vertigo episode while at work over this past week. \"Fell to the ground; couldn't tell what was up; lost control of my limbs; some nausea; went to bed and passed out. 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 Symptoms remained for x4 hours. No relief with current cervical spine HEP interventions- \"if anything my vertigo is getting worse. \"  Unable to visit urgent care, ED secondary to financial concerns.  Unable to follow-up with PCP until March. No longer utilizing anti-dizziness medication- declines secondary to \"probably the highest I've ever been. \"  Pt emotionally labile for duration of subjective evaluation. Agreeable to discharge today from PT services. Assessment:  Pt presents extremely emotionally labile, and reports experiencing an additional, intense vertigo episode while at work over this past week. Therefore, inquired regarding specifics of this most recent episode- as detailed per above- may be secondary to sudden drop in blood pressure/POTS. Pt may benefit from tilt table test, with potential medical management, if deemed appropriate by referring MD- and also if pt consents. Otherwise, pt reports worse cervical spine pain and dizziness with PT services thus far. Therefore, held all treatment today. Finished with goal update and re-assessment of NDI for discharge note to referring MD- results as detailed above and below- no goals met. Instructed as follows: D/C all current cervical spine HEP interventions; telephone referring MD- requires a sooner follow-up; may benefit from a tilt table test, with medical management, if deemed appropriate by MD; discharge today from PT services- verbalized understanding to all. No further PT services recommended at this time. STG: (to be met in 6 treatments)  1. ? Pain: 4/10- NOT MET- 3-9/10 over this past week   2. Patient to be independent with home exercise program as demonstrated by performance with correct form without cues. - UNCLEAR     LTG: (to be met in 16 treatments)  1. Patient will improve NDI score by 10%- NOT MET  2.  Patient will be independent in self management strategies for neck pain- NOT MET      Patient goals: Reduce neck pain- NOT MET    Treatment to Date:  [x] Therapeutic Exercise    [x] Modalities:  [x] Therapeutic Activity    [] Ultrasound  [] Electrical Stimulation  [] Gait Training     [] Massage       [] Lumbar/Cervical Traction  [] Neuromuscular Re-education [x] Cold/hotpack [] Iontophoresis: 4 mg/mL  [x] Instruction in Home Exercise Program                     Dexamethasone Sodium  [x] Manual Therapy             Phosphate 40-80 mAmin  [] Aquatic Therapy                   [] Vasocompression/    [] Other:             Game Ready    Discharge Status:     [] Pt recovered from conditions. Treatment goals were met. [] Pt received maximum benefit. No further therapy indicated at this time. [] Pt to continue exercise/home instructions independently. [] Therapy interrupted due to:    [] Pt has 2 or more no shows/cancels, is discontinued per our policy. [] Pt has completed prescribed number of treatment sessions. [x] Other: SEE ABOVE. Electronically signed by Earlene Andrews PT on 12/14/2021 at 3:53 PM    If you have any questions or concerns, please don't hesitate to call.   Thank you for your referral.

## 2021-12-15 ENCOUNTER — OFFICE VISIT (OUTPATIENT)
Dept: NEUROSURGERY | Age: 31
End: 2021-12-15
Payer: MEDICARE

## 2021-12-15 VITALS
DIASTOLIC BLOOD PRESSURE: 66 MMHG | HEIGHT: 64 IN | OXYGEN SATURATION: 99 % | WEIGHT: 188 LBS | HEART RATE: 55 BPM | SYSTOLIC BLOOD PRESSURE: 100 MMHG | BODY MASS INDEX: 32.1 KG/M2

## 2021-12-15 DIAGNOSIS — R20.2 NUMBNESS AND TINGLING OF BOTH UPPER EXTREMITIES: Primary | ICD-10-CM

## 2021-12-15 DIAGNOSIS — M47.22 CERVICAL SPONDYLOSIS WITH RADICULOPATHY: ICD-10-CM

## 2021-12-15 DIAGNOSIS — R42 DIZZINESS: ICD-10-CM

## 2021-12-15 DIAGNOSIS — R20.0 NUMBNESS AND TINGLING OF BOTH UPPER EXTREMITIES: Primary | ICD-10-CM

## 2021-12-15 PROCEDURE — G8482 FLU IMMUNIZE ORDER/ADMIN: HCPCS | Performed by: NURSE PRACTITIONER

## 2021-12-15 PROCEDURE — 1036F TOBACCO NON-USER: CPT | Performed by: NURSE PRACTITIONER

## 2021-12-15 PROCEDURE — G8427 DOCREV CUR MEDS BY ELIG CLIN: HCPCS | Performed by: NURSE PRACTITIONER

## 2021-12-15 PROCEDURE — G8417 CALC BMI ABV UP PARAM F/U: HCPCS | Performed by: NURSE PRACTITIONER

## 2021-12-15 PROCEDURE — 99214 OFFICE O/P EST MOD 30 MIN: CPT | Performed by: NURSE PRACTITIONER

## 2021-12-15 NOTE — PROGRESS NOTES
[place]  [facility] [address], [address]  Dept: [phone]    Patient:  Chadwick Sofia  YOB: 1990  Date: 10/27/21    The patient is a 32 y.o. female who presents today for consult of the following problems:     Chief Complaint   Patient presents with    Follow-up         HPI:     Chadwick Sofia is a 32 y.o. female on whom neurosurgical consultation was requested by Leroy Lopez PA-C for management of neck pain and arm symptoms. Reports having had left shoulder/neck pain for the last 3-4 months. Pain can range from 4-8/10 depending on physical activity. Intermittent numbness and tingling to both arms, non-dermatomal distribution, left > right. Rare clumsiness/weakness to hands. Utilizing ice periodically, as well as ibuprofen. Did initially explore left shoulder as source of pain, was evaluated by orthopedic specialist through [facility], and sent for MRI cervical spine and subsequently here. Additionally patient reports that she has been having episodic dizziness, that at times can be incapacitating. Reports that this has occurred while undergoing physical therapy and has required stopping the session. States that this has been an ongoing issue for her for quite some time, but seems to have worsened over the last handful of months. Nocturnal Awakening: No  Reason: n/a    MYELOPATHY    Frequently dropping things: No  Difficulty with buttoning clothes, using zipper, putting on watch OR jewelry: No  Changes in handwriting: No  Numbness or tingling: Yes    LIMITATIONS    Pain significantly limiting on a daily basis   Daily pharmacologic pain control include: ibuprofen  Neck : Arm pain: Neck 50 // Arms 50    MANAGEMENT     Prior Surgery: No  Prior to 1yr ago:   In the last year:    Physical Therapy: Yes   Chiropractic Interventions: No   Injections: No  Improvement: n/a    Injections/response: n/a    History:     Past Medical History:   Diagnosis Date    Anxiety     Borderline personality disorder ([facility] [address]     Cervical spondylosis     Depression     Dysphagia     Latex allergy     Psoriasis      History reviewed. No pertinent surgical history. History reviewed. No pertinent family history. Current Outpatient Medications on File Prior to Visit   Medication Sig Dispense Refill    hydrOXYzine (ATARAX) 10 MG tablet Take 10 mg by mouth every 8 hours as needed for Itching      clonazePAM (KLONOPIN) 0.25 MG disintegrating tablet Take 1 tablet by mouth nightly as needed for Anxiety for up to 10 days. 10 tablet 0    OXcarbazepine (TRILEPTAL) 150 MG tablet take 1 tablet by mouth twice a day 60 tablet 1    meclizine (ANTIVERT) 25 MG tablet take 1 tablet by mouth three times a day if needed for dizziness (Patient not taking: Reported on 12/15/2021) 30 tablet 0    levonorgestrel-ethinyl estradiol (AVIANE) 0.1-20 MG-MCG per tablet Take 1 tablet by mouth daily (Patient not taking: Reported on 12/15/2021) 1 packet 3    [DISCONTINUED] meclizine (ANTIVERT) 25 MG tablet Take 1 tablet by mouth 3 times daily as needed for Dizziness 30 tablet 0     No current facility-administered medications on file prior to visit. Social History     Tobacco Use    Smoking status: Never Smoker    Smokeless tobacco: Never Used   Vaping Use    Vaping Use: Never used   Substance Use Topics    Alcohol use: Yes     Comment: once a year    Drug use: No       Allergies   Allergen Reactions    Latex     Pollen Extract        Review of Systems  Constitutional: Negative for activity change and appetite change. HENT: Negative for ear pain and facial swelling. Eyes: Negative for discharge and itching. Respiratory: Negative for choking and chest tightness. Cardiovascular: Negative for chest pain and leg swelling. Gastrointestinal: Negative for nausea and abdominal pain.    Endocrine: Negative for cold intolerance and heat intolerance. Genitourinary: Negative for frequency and flank pain. Musculoskeletal: Negative for myalgias and joint swelling. Skin: Negative for rash and wound. Allergic/Immunologic: Negative for environmental allergies and food allergies. Hematological: Negative for adenopathy. Does not bruise/bleed easily. Psychiatric/Behavioral: Negative for self-injury. The patient is not nervous/anxious. Physical Exam:      /66   Pulse 55   Ht 5' 4\" (1.626 m)   Wt 188 lb (85.3 kg)   SpO2 99%   BMI 32.27 kg/m²   Estimated body mass index is 32.27 kg/m² as calculated from the following:    Height as of this encounter: 5' 4\" (1.626 m). Weight as of this encounter: 188 lb (85.3 kg). General:  Isac Flores is a 32y.o. year old female who appears her stated age. HEENT: Normocephalic atraumatic. Neck supple. Chest: regular rate; pulses equal  Abdomen: Soft nontender nondistended.    Ext: DP and PT pulses 2+, good cap refill  Neuro    Mentation  Appropriate affect  Registration intact  Orientation intact  3 item recall intact  Judgement intact to situation    Cranial Nerves:   Pupils equal and reactive to light  Extraocular motion intact  Face and shrug symmetric  Tongue midline  No dysarthria  v1-3 sensation symmetric, masseter tone symmetric  Hearing symmetric and intact    Sensation: Intact  Ring finger split: Negative    Motor  L deltoid 5/5; R deltoid 5/5  L biceps 5/5; R biceps 5/5  L triceps 5/5; R triceps 5/5  L wrist extension 5/5; R wrist extension 5/5  L intrinsics 5/5; R intrinsics 5/5     L iliopsoas 5/5 , R iliopsoas 5/5  L quadriceps 5/5; R quadriceps 5/5  L Dorsiflexion 5/5; R dorsiflexion 5/5  L Plantarflexion 5/5; R plantarflexion 5/5  L EHL 5/5; R EHL 5/5    Reflexes  L Brachioradialis 2+/4; R brachioradialis 2+/4  L Biceps 2+/4; R Biceps 2+/4  L Triceps 2+/4; R Triceps 2+/4  L Patellar 2+/4: R Patellar 2+/4  L Achilles 2+/4; R Achilles 2+/4    hoffmans L: neg  hoffmans R: neg  Clonus L: neg  Clonus R: neg  Babinski L: neg  Babinski R: neg    Spurlings: No  Lhermittes: No    Tinels at elbow: No  Tinels at wrist: No  Phalens: No  Ok sign: No  Froments: No  Benedictine Hand: No    Atrophy of thenar: No  Atrophy of hypothenar: No    Studies Review:     MRI cervical spine 9/24/2021 (awaiting images):  Findings:     Signal intensity and morphology of the included brainstem and spinal cord are normal.  No suspicious intrathecal lesions.  Incidentally included paraspinal soft tissues are grossly unremarkable for technique. Craniocervical junction is intact.  No vertebral body compression fracture or malalignment.  No prevertebral soft tissue swelling. C2-C3: Normal     C3-C4: Facet and uncovertebral joint arthropathy cause moderate left and mild right neuroforaminal stenosis. C4-C5: A broad-based disc osteophyte complex is noted with mild facet and uncovertebral joint arthropathy causing mild to moderate bilateral neuroforaminal stenosis. C5-C6: Large broad-based disc osteophyte complex noted with marked facet and uncovertebral joint arthropathy cause moderate spinal canal and left neuroforaminal stenosis.  Right neuroforamen is mildly narrowed. C6-C7: Broad-based, irregular disc osteophyte complex noted with severe facet and uncovertebral joint arthropathy cause moderate spinal canal, severe left and moderate right neuroforaminal stenosis. C7-T1: Mild uncovertebral joint arthropathy without spinal canal or neuroforaminal stenosis. PCP/PT notes reviewed    Assessment and Plan:      1. Numbness and tingling of both upper extremities    2. Dizziness    3. Cervical spondylosis with radiculopathy          Plan: Patient continuing to experience intermittent numbness and tingling in nondermatomal distribution to bilateral upper extremities. Also with complaints of persistent, episodic episodes of severe dizziness.   Would recommend obtaining brain MRI to rule out any underlying causes for dizziness. We will also obtain EMG bilateral upper extremities to further evaluate numbness and tingling. Continue physical therapy. We will see the patient back in 6 to 8 weeks after additional testing. Followup: Return in about 6 weeks (around 1/26/2022), or if symptoms worsen or fail to improve. Prescriptions Ordered:  No orders of the defined types were placed in this encounter. Orders Placed:  Orders Placed This Encounter   Procedures    MRI BRAIN W WO CONTRAST     Standing Status:   Future     Standing Expiration Date:   12/15/2022     Order Specific Question:   Reason for exam:     Answer:   vertigo; dizziness    EMG     Standing Status:   Future     Standing Expiration Date:   12/15/2022     Order Specific Question:   Which body part? Answer:   bilateral upper extremities        Electronically signed by BRIGHT Tyson CNP on 12/15/2021 at 2:36 PM    Please note that this chart was generated using voice recognition Dragon dictation software. Although every effort was made to ensure the accuracy of this automated transcription, some errors in transcription may have occurred.

## 2021-12-15 NOTE — PATIENT INSTRUCTIONS
Patient Education        Vertigo: Exercises  Introduction  Here are some examples of exercises for you to try. The exercises may be suggested for a condition or for rehabilitation. Start each exercise slowly. Ease off the exercises if you start to have pain. You will be told when to start these exercises and which ones will work best for you. How to do the exercises  Exercise 1    1. Stand with a chair in front of you and a wall behind you. If you begin to fall, you may use them for support. 2. Stand with your feet together and your arms at your sides. 3. Move your head up and down 10 times. Exercise 2    1. Move your head side to side 10 times. Exercise 3    1. Move your head diagonally up and down 10 times. Exercise 4    1. Move your head diagonally up and down 10 times on the other side. Follow-up care is a key part of your treatment and safety. Be sure to make and go to all appointments, and call your doctor if you are having problems. It's also a good idea to know your test results and keep a list of the medicines you take. Where can you learn more? Go to https://[employer] Promos.g4interactive. org and sign in to your [employer] account. Enter F349 in the Shopear box to learn more about \"Vertigo: Exercises. \"     If you do not have an account, please click on the \"Sign Up Now\" link. Current as of: December 2, 2020               Content Version: 13.0  © 3858-4883 [employer]wise, Incorporated. Care instructions adapted under license by [facility] ([facility]). If you have questions about a medical condition or this instruction, always ask your healthcare professional. [address] any warranty or liability for your use of this information.

## 2021-12-20 ENCOUNTER — APPOINTMENT (OUTPATIENT)
Dept: PHYSICAL THERAPY | Facility: CLINIC | Age: 31
End: 2021-12-20
Payer: MEDICARE

## 2021-12-21 ENCOUNTER — APPOINTMENT (OUTPATIENT)
Dept: PHYSICAL THERAPY | Facility: CLINIC | Age: 31
End: 2021-12-21
Payer: MEDICARE

## 2021-12-29 ENCOUNTER — HOSPITAL ENCOUNTER (OUTPATIENT)
Dept: MRI IMAGING | Age: 31
Discharge: HOME | End: 2021-12-31
Payer: MEDICARE

## 2021-12-29 DIAGNOSIS — R20.2 NUMBNESS AND TINGLING OF BOTH UPPER EXTREMITIES: ICD-10-CM

## 2021-12-29 DIAGNOSIS — R20.0 NUMBNESS AND TINGLING OF BOTH UPPER EXTREMITIES: ICD-10-CM

## 2021-12-29 DIAGNOSIS — R42 DIZZINESS: ICD-10-CM

## 2021-12-29 PROCEDURE — A9576 INJ PROHANCE MULTIPACK: HCPCS | Performed by: NURSE PRACTITIONER

## 2021-12-29 PROCEDURE — 6360000004 HC RX CONTRAST MEDICATION: Performed by: NURSE PRACTITIONER

## 2021-12-29 PROCEDURE — 70553 MRI BRAIN STEM W/O & W/DYE: CPT

## 2021-12-29 RX ADMIN — GADOTERIDOL 16 ML: 279.3 INJECTION, SOLUTION INTRAVENOUS at 08:24

## 2022-01-11 ENCOUNTER — OFFICE VISIT (OUTPATIENT)
Dept: NEUROSURGERY | Age: 32
End: 2022-01-11
Payer: MEDICARE

## 2022-01-11 VITALS
DIASTOLIC BLOOD PRESSURE: 60 MMHG | OXYGEN SATURATION: 99 % | TEMPERATURE: 98.1 F | HEART RATE: 86 BPM | BODY MASS INDEX: 32.1 KG/M2 | WEIGHT: 188 LBS | SYSTOLIC BLOOD PRESSURE: 97 MMHG | HEIGHT: 64 IN

## 2022-01-11 DIAGNOSIS — R51.9 NONINTRACTABLE EPISODIC HEADACHE, UNSPECIFIED HEADACHE TYPE: ICD-10-CM

## 2022-01-11 DIAGNOSIS — Q04.8 CEREBELLAR TONSILLAR ECTOPIA (HCC): ICD-10-CM

## 2022-01-11 DIAGNOSIS — R42 DIZZINESS: Primary | ICD-10-CM

## 2022-01-11 PROCEDURE — G8482 FLU IMMUNIZE ORDER/ADMIN: HCPCS | Performed by: NURSE PRACTITIONER

## 2022-01-11 PROCEDURE — 1036F TOBACCO NON-USER: CPT | Performed by: NURSE PRACTITIONER

## 2022-01-11 PROCEDURE — G8427 DOCREV CUR MEDS BY ELIG CLIN: HCPCS | Performed by: NURSE PRACTITIONER

## 2022-01-11 PROCEDURE — G8417 CALC BMI ABV UP PARAM F/U: HCPCS | Performed by: NURSE PRACTITIONER

## 2022-01-11 PROCEDURE — 99203 OFFICE O/P NEW LOW 30 MIN: CPT | Performed by: NURSE PRACTITIONER

## 2022-01-11 NOTE — PROGRESS NOTES
[address] [place]  [facility] [address] [facility] [address]  Dept: [phone]    Patient:  Jose Gates  YOB: 1990  Date: 1/11/22    The patient is a 32 y.o. female who presents today for consult of the following problems:     Chief Complaint   Patient presents with    Follow-up         HPI:     Jose Gates is a 32 y.o. female who presents for follow-up of dizziness, numbness and tingling to upper extremities. Patient had been experiencing left shoulder and neck pain for several months, would have intermittent numbness and tingling in nondermatomal distribution to bilateral upper extremities, left more so than right. Rarely had issues with clumsiness, weakness in her hands. Since her last office visit upper extremity symptoms have actually improved. Having occasional intermittent numbness and tingling, however pain has improved and not currently experiencing any numbness or tingling. Does have EMG scheduled in the next couple of months. Most significant complaint today is continued episodes of dizziness. Also having episodes of brief headaches several times throughout the day. No distinct triggers. Denies any headaches with coughing, sneezing or Valsalva maneuvers. Pain is typically located to right occipital/parietal region, brief, sharp lasting anywhere from a few seconds to a couple of minutes. No associated photo or phonophobia, nausea, vomiting. History:     Past Medical History:   Diagnosis Date    Anxiety     Borderline personality disorder ([address]     Cervical spondylosis     Depression     Dysphagia     Latex allergy     Psoriasis      History reviewed. No pertinent surgical history. History reviewed. No pertinent family history.   Current Outpatient Medications on File Prior to Visit   Medication Sig Dispense Refill    hydrOXYzine (ATARAX) 10 MG tablet Take 10 mg by mouth every 8 hours as needed for Itching      clonazePAM (KLONOPIN) 0.25 MG disintegrating tablet Take 1 tablet by mouth nightly as needed for Anxiety for up to 10 days. 10 tablet 0    OXcarbazepine (TRILEPTAL) 150 MG tablet take 1 tablet by mouth twice a day 60 tablet 1    meclizine (ANTIVERT) 25 MG tablet take 1 tablet by mouth three times a day if needed for dizziness (Patient not taking: Reported on 12/15/2021) 30 tablet 0    levonorgestrel-ethinyl estradiol (AVIANE) 0.1-20 MG-MCG per tablet Take 1 tablet by mouth daily (Patient not taking: Reported on 12/15/2021) 1 packet 3    [DISCONTINUED] meclizine (ANTIVERT) 25 MG tablet Take 1 tablet by mouth 3 times daily as needed for Dizziness 30 tablet 0     No current facility-administered medications on file prior to visit. Social History     Tobacco Use    Smoking status: Never Smoker    Smokeless tobacco: Never Used   Vaping Use    Vaping Use: Never used   Substance Use Topics    Alcohol use: Yes     Comment: once a year    Drug use: No       Allergies   Allergen Reactions    Latex     Pollen Extract        Review of Systems  Constitutional: Negative for activity change and appetite change. HENT: Negative for ear pain and facial swelling. Eyes: Negative for discharge and itching. Respiratory: Negative for choking and chest tightness. Cardiovascular: Negative for chest pain and leg swelling. Gastrointestinal: Negative for nausea and abdominal pain. Endocrine: Negative for cold intolerance and heat intolerance. Genitourinary: Negative for frequency and flank pain. Musculoskeletal: Negative for myalgias and joint swelling. Skin: Negative for rash and wound. Allergic/Immunologic: Negative for environmental allergies and food allergies. Hematological: Negative for adenopathy. Does not bruise/bleed easily. Psychiatric/Behavioral: Negative for self-injury. The patient is not nervous/anxious. Physical Exam:      BP 97/60   Pulse 86   Temp 98.1 °F (36.7 °C) (Temporal)   Ht 5' 4\" (1.626 m)   Wt 188 lb (85.3 kg)   SpO2 99%   BMI 32.27 kg/m²   Estimated body mass index is 32.27 kg/m² as calculated from the following:    Height as of this encounter: 5' 4\" (1.626 m). Weight as of this encounter: 188 lb (85.3 kg). General:  Ephraim Elliott is a 32y.o. year old female who appears her stated age. HEENT: Normocephalic atraumatic. Neck supple. Chest: regular rate; pulses equal  Abdomen: Soft nontender nondistended. Ext: DP and PT pulses 2+, good cap refill  Neuro    Mentation  Appropriate affect  Registration intact  Orientation intact  3 item recall intact  Judgement intact to situation    Cranial Nerves:   Pupils equal and reactive to light  Extraocular motion intact  Face and shrug symmetric  Tongue midline  No dysarthria  v1-3 sensation symmetric, masseter tone symmetric  Hearing symmetric    Sensation: Intact    Motor  L deltoid 5/5; R deltoid 5/5  L biceps 5/5; R biceps 5/5  L triceps 5/5; R triceps 5/5  L wrist extension 5/5; R wrist extension 5/5  L intrinsics 5/5; R intrinsics 5/5     L iliopsoas 5/5 , R iliopsoas 5/5  L quadriceps 5/5; R quadriceps 5/5  L Dorsiflexion 5/5; R dorsiflexion 5/5  L Plantarflexion 5/5; R plantarflexion 5/5  L EHL 5/5; R EHL 5/5    Reflexes  L Brachioradialis 2+/4; R brachioradialis 2+/4  L Biceps 2+/4; R Biceps 2+/4  L Triceps 2+/4; R Triceps 2+/4  L Patellar 2+/4: R Patellar 2+/4  L Achilles 2+/4; R Achilles 2+/4    hoffmans L: neg  hoffmans R: neg  Clonus L: neg  Clonus R: neg  Babinski L: neg  Babinski R: neg    Studies Review:     MRI brain 12/29/2021 (images reviewed): FINDINGS:   INTRACRANIAL STRUCTURES/VENTRICLES: Rickford Math is no acute infarct. No mass, mass   effect, edema or hemorrhage is seen.  The brain is normal in volume.  No   signal abnormality or focal lesion is seen in the brain. 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  The skull base arterial flow voids are grossly intact. No hydrocephalus or extra-axial fluid is seen.       ORBITS: The visualized portion of the orbits demonstrate no acute abnormality.       SINUSES:  A mucous retention cyst is seen in the left maxillary sinus. Minimal mucosal thickening in the ethmoid sinuses.  The mastoids are clear.       BONES/SOFT TISSUES: The bone marrow signal intensity appears normal. The soft   tissues demonstrate no acute abnormality. Physical therapy notes reviewed    Assessment and Plan:      1. Dizziness    2. Nonintractable episodic headache, unspecified headache type    3. Cerebellar tonsillar ectopia (HCC)          Plan: Patient currently experiencing improved upper extremity symptoms, no numbness tingling or sensorimotor deficits present on exam.  Continues to struggle with brief episodes of headaches, as well as intermittent dizziness. MRI brain without any acute findings, some tonsillar ectopia present, however headaches do not seem consistent with Chiari. Does have upcoming EMG planned. Will provide referral for neurology evaluation. Followup: Return in about 8 weeks (around 3/8/2022), or if symptoms worsen or fail to improve. Prescriptions Ordered:  No orders of the defined types were placed in this encounter. Orders Placed:  Orders Placed This Encounter   Procedures   Clarita Lopez MD, Neurology, [facility] [address]     Referral Priority:   Routine     Referral Type:   Eval and Treat     Referral Reason:   Specialty Services Required     Referred to Provider:   Nya Amaya MD     Requested Specialty:   Neurology     Number of Visits Requested:   1        Electronically signed by BRIGHT Brambila CNP on 1/12/2022 at 9:54 AM    Please note that this chart was generated using voice recognition Dragon dictation software.   Although every effort was made to ensure the accuracy of this automated transcription, some errors in transcription may have occurred.

## 2022-01-12 PROBLEM — Q04.8 CEREBELLAR TONSILLAR ECTOPIA (HCC): Status: ACTIVE | Noted: 2022-01-12

## 2022-02-22 ENCOUNTER — OFFICE VISIT (OUTPATIENT)
Dept: FAMILY MEDICINE CLINIC | Age: 32
End: 2022-02-22
Payer: MEDICARE

## 2022-02-22 VITALS
WEIGHT: 191 LBS | HEART RATE: 71 BPM | SYSTOLIC BLOOD PRESSURE: 126 MMHG | TEMPERATURE: 97.1 F | BODY MASS INDEX: 32.61 KG/M2 | OXYGEN SATURATION: 98 % | HEIGHT: 64 IN | DIASTOLIC BLOOD PRESSURE: 80 MMHG

## 2022-02-22 DIAGNOSIS — R19.8 ALTERNATING CONSTIPATION AND DIARRHEA: ICD-10-CM

## 2022-02-22 DIAGNOSIS — L81.9 CHANGING PIGMENTED SKIN LESION: ICD-10-CM

## 2022-02-22 DIAGNOSIS — R10.9 ABDOMINAL CRAMPING: Primary | ICD-10-CM

## 2022-02-22 LAB
BILIRUBIN, POC: NORMAL
BLOOD URINE, POC: NORMAL
CLARITY, POC: CLEAR
COLOR, POC: YELLOW
CONTROL: POSITIVE
GLUCOSE URINE, POC: NORMAL
KETONES, POC: NORMAL
LEUKOCYTE EST, POC: NORMAL
NITRITE, POC: NORMAL
PH, POC: 8.5
PREGNANCY TEST URINE, POC: NEGATIVE
PROTEIN, POC: NORMAL
SPECIFIC GRAVITY, POC: 1.02
UROBILINOGEN, POC: 1

## 2022-02-22 PROCEDURE — 81003 URINALYSIS AUTO W/O SCOPE: CPT | Performed by: PHYSICIAN ASSISTANT

## 2022-02-22 PROCEDURE — G8427 DOCREV CUR MEDS BY ELIG CLIN: HCPCS | Performed by: PHYSICIAN ASSISTANT

## 2022-02-22 PROCEDURE — 81025 URINE PREGNANCY TEST: CPT | Performed by: PHYSICIAN ASSISTANT

## 2022-02-22 PROCEDURE — 1036F TOBACCO NON-USER: CPT | Performed by: PHYSICIAN ASSISTANT

## 2022-02-22 PROCEDURE — G8417 CALC BMI ABV UP PARAM F/U: HCPCS | Performed by: PHYSICIAN ASSISTANT

## 2022-02-22 PROCEDURE — G8482 FLU IMMUNIZE ORDER/ADMIN: HCPCS | Performed by: PHYSICIAN ASSISTANT

## 2022-02-22 PROCEDURE — 99213 OFFICE O/P EST LOW 20 MIN: CPT | Performed by: PHYSICIAN ASSISTANT

## 2022-02-22 RX ORDER — GREEN TEA/HOODIA GORDONII 315-12.5MG
1 CAPSULE ORAL DAILY
Qty: 30 TABLET | Refills: 3 | Status: SHIPPED | OUTPATIENT
Start: 2022-02-22 | End: 2022-03-24

## 2022-02-22 RX ORDER — DICYCLOMINE HYDROCHLORIDE 10 MG/1
10 CAPSULE ORAL 4 TIMES DAILY
Qty: 30 CAPSULE | Refills: 1 | Status: SHIPPED | OUTPATIENT
Start: 2022-02-22 | End: 2022-08-10

## 2022-02-22 ASSESSMENT — ENCOUNTER SYMPTOMS
CONSTIPATION: 1
ABDOMINAL PAIN: 0
COUGH: 0
BLOOD IN STOOL: 0
SHORTNESS OF BREATH: 0
NAUSEA: 0
DIARRHEA: 1
WHEEZING: 0
COLOR CHANGE: 0
VOMITING: 0

## 2022-02-22 ASSESSMENT — PATIENT HEALTH QUESTIONNAIRE - PHQ9
SUM OF ALL RESPONSES TO PHQ9 QUESTIONS 1 & 2: 0
2. FEELING DOWN, DEPRESSED OR HOPELESS: 0
SUM OF ALL RESPONSES TO PHQ QUESTIONS 1-9: 0
9. THOUGHTS THAT YOU WOULD BE BETTER OFF DEAD, OR OF HURTING YOURSELF: 0
SUM OF ALL RESPONSES TO PHQ QUESTIONS 1-9: 0
7. TROUBLE CONCENTRATING ON THINGS, SUCH AS READING THE NEWSPAPER OR WATCHING TELEVISION: 0
3. TROUBLE FALLING OR STAYING ASLEEP: 0
8. MOVING OR SPEAKING SO SLOWLY THAT OTHER PEOPLE COULD HAVE NOTICED. OR THE OPPOSITE, BEING SO FIGETY OR RESTLESS THAT YOU HAVE BEEN MOVING AROUND A LOT MORE THAN USUAL: 0
4. FEELING TIRED OR HAVING LITTLE ENERGY: 0
1. LITTLE INTEREST OR PLEASURE IN DOING THINGS: 0
SUM OF ALL RESPONSES TO PHQ QUESTIONS 1-9: 0
6. FEELING BAD ABOUT YOURSELF - OR THAT YOU ARE A FAILURE OR HAVE LET YOURSELF OR YOUR FAMILY DOWN: 0
SUM OF ALL RESPONSES TO PHQ QUESTIONS 1-9: 0
5. POOR APPETITE OR OVEREATING: 0

## 2022-02-22 NOTE — PROGRESS NOTES
[address] [facility]  [address] [address]  Dept: [phone]  Dept Fax: [phone]    Delilah Roberto is a 32 y.o. female who presents today for her medical conditions/complaintsas noted below. Delilah Roberto is c/o of   Chief Complaint   Patient presents with    Abdominal Pain     x 2 weeks    Diarrhea    Urinary Frequency         HPI:     HPI    Patient here reporting she has been having lower abdominal cramps daily for the last 2 weeks. She states they are light but noticeable. She is also having lower back pain, some constipation and diarrhea. She is really stressed at work as well and reports often has looser stools with stress. Last menses was 2 weeks ago. She has been using a menstrual tracker and states has been really regular. Appetite has been less recently.  works as a  at [employer] and the stress load is more than she wants to deal with. She has started looking for a different job. She has been discussing this with her psychiatrist as well. They are also encouraging her to look for a different job. Patient is engaged. She is sexually active in that relationship but states they are waiting for any penetration intercourse until after . No results found for: LABA1C          ( goal A1Cis < 7)   No results found for: LABMICR  LDL Cholesterol (mg/dL)   Date Value   11/09/2021 49       (goal LDL is <100)   AST (U/L)   Date Value   11/09/2021 20     ALT (U/L)   Date Value   11/09/2021 15     BUN (mg/dL)   Date Value   11/09/2021 13     BP Readings from Last 3 Encounters:   02/22/22 126/80   01/11/22 97/60   12/15/21 100/66          (goal 120/80)    Past Medical History:   Diagnosis Date    Anxiety     Borderline personality disorder ([address]     Cervical spondylosis     Depression     Dysphagia     Latex allergy     Psoriasis       History reviewed.  No pertinent surgical history. History reviewed. No pertinent family history. Social History     Tobacco Use    Smoking status: Never Smoker    Smokeless tobacco: Never Used   Substance Use Topics    Alcohol use: Yes     Comment: once a year      Current Outpatient Medications   Medication Sig Dispense Refill    dicyclomine (BENTYL) 10 MG capsule Take 1 capsule by mouth 4 times daily 30 capsule 1    Probiotic Acidophilus (FLORANEX) TABS Take 1 tablet by mouth daily 30 tablet 3    hydrOXYzine (ATARAX) 10 MG tablet Take 10 mg by mouth every 8 hours as needed for Itching      clonazePAM (KLONOPIN) 0.25 MG disintegrating tablet Take 1 tablet by mouth nightly as needed for Anxiety for up to 10 days. 10 tablet 0    OXcarbazepine (TRILEPTAL) 150 MG tablet take 1 tablet by mouth twice a day 60 tablet 1     No current facility-administered medications for this visit. Allergies   Allergen Reactions    Latex     Pollen Extract        Health Maintenance   Topic Date Due    Hepatitis C screen  Never done    Varicella vaccine (1 of 2 - 2-dose childhood series) Never done    Depression Monitoring  Never done    Cervical cancer screen  09/28/2020    COVID-19 Vaccine (3 - Booster for Pfizer series) 09/20/2021    DTaP/Tdap/Td vaccine (2 - Td or Tdap) 07/24/2028    Flu vaccine  Completed    HIV screen  Completed    Hepatitis A vaccine  Aged Out    Hepatitis B vaccine  Aged Out    Hib vaccine  Aged Out    Meningococcal (ACWY) vaccine  Aged Out    Pneumococcal 0-64 years Vaccine  Aged Out       Subjective:     Review of Systems   Constitutional: Negative for activity change, appetite change, fatigue, fever and unexpected weight change. /80 (Site: Left Upper Arm, Position: Sitting, Cuff Size: Large Adult)   Pulse 71   Temp 97.1 °F (36.2 °C) (Tympanic)   Ht 5' 4\" (1.626 m)   Wt 191 lb (86.6 kg)   SpO2 98%   BMI 32.79 kg/m²    Respiratory: Negative for cough, shortness of breath and wheezing. Cardiovascular: Negative for chest pain and palpitations. Gastrointestinal: Positive for constipation and diarrhea. Negative for abdominal pain, blood in stool, nausea and vomiting. Genitourinary: Positive for dysuria and frequency. Negative for hematuria, menstrual problem and urgency. Skin: Negative for color change and pallor. Neurological: Negative for weakness. Hematological: Negative for adenopathy. Psychiatric/Behavioral: Negative for agitation, behavioral problems and confusion. The patient is nervous/anxious. Objective:     Physical Exam  Vitals and nursing note reviewed. Constitutional:       General: She is not in acute distress. Appearance: Normal appearance. She is well-developed. She is not ill-appearing. HENT:      Head: Normocephalic and atraumatic. Abdominal:      General: Abdomen is flat. Bowel sounds are normal. There is no distension. Palpations: Abdomen is soft. There is no mass. Tenderness: There is abdominal tenderness (mild generalized). There is no right CVA tenderness, left CVA tenderness, guarding or rebound. Hernia: No hernia is present. Skin:     General: Skin is warm and dry. Coloration: Skin is not pale. Findings: No erythema or rash. Comments: Superior right cheek with round pink background with topical small dark brown spot. No open areas, no weeping, no tenderness   Neurological:      Mental Status: She is alert and oriented to person, place, and time. Psychiatric:         Attention and Perception: Attention normal.         Mood and Affect: Mood is anxious. Speech: Speech normal.         Behavior: Behavior normal.         Thought Content: Thought content normal.         Judgment: Judgment normal.      Comments: Patient is tearful today. Reporting stress has been very bad with work and finances.        /80 (Site: Left Upper Arm, Position: Sitting, Cuff Size: Large Adult)   Pulse 71   Temp 97.1 °F (36.2 °C) (Tympanic)   Ht 5' 4\" (1.626 m)   Wt 191 lb (86.6 kg)   SpO2 98%   BMI 32.79 kg/m²     Assessment:       Diagnosis Orders   1. Abdominal cramping  POCT Urinalysis No Micro (Auto)    POCT urine pregnancy    dicyclomine (BENTYL) 10 MG capsule    Probiotic Acidophilus (FLORANEX) TABS   2. Alternating constipation and diarrhea  Probiotic Acidophilus (FLORANEX) TABS   3. Changing pigmented skin lesion  BRADY Morales, Dermatology, [facility]:      Return if symptoms worsen or fail to improve. Patient with recent intermittent abdominal cramping. Urine for UA and pregnancy are both negative today. She has had alterations in constipation and diarrhea. She has been very stressed with work and finances. She is tearful at the appointment today. She does follow with a psychiatrist regularly. I do think the 2 may be related. I recommended she start daily probiotic and sent this to the pharmacy for her. I also recommended she try Bentyl 10 mg 3-4 times daily as needed for abdominal cramping.   I encouraged to get more fiber and water in her diet to help with regularity  I asked her to call me back in 2 weeks with an update on how she is feeling  Referral for dermatology assessment of facial lesion given, patient will call to schedule  Patient agreed with treatment plan    Results for orders placed or performed in visit on 02/22/22   POCT Urinalysis No Micro (Auto)   Result Value Ref Range    Color, UA yellow     Clarity, UA Clear     Glucose, UA POC Neg     Bilirubin, UA Neg     Ketones, UA Neg     Spec Grav, UA 1.020     Blood, UA POC Neg     pH, UA 8.5     Protein, UA POC Neg     Urobilinogen, UA 1.0     Leukocytes, UA Neg     Nitrite, UA Neg    POCT urine pregnancy   Result Value Ref Range    Preg Test, Ur negative     Control positive          Orders Placed This Encounter   Procedures    BRADY Morales, Dermatology, [facility]     Referral Priority:   Routine     Referral Type:   Eval and Treat     Referral Reason:   Specialty Services Required     Referred to Provider:   Sandra Rainey PA-C     Requested Specialty:   Dermatology     Number of Visits Requested:   1    POCT Urinalysis No Micro (Auto)    POCT urine pregnancy       Patient given educational materials - see patient instructions. Discussed use, benefit, and side effects of prescribed medications. All patientquestions answered. Pt voiced understanding. Reviewed health maintenance. Instructedto continue current medications, diet and exercise. Patient agreed with treatmentplan. Follow up as directed.      Electronically signed by Dary Fink PA-C on 2/22/2022 at 9:28 AM

## 2022-02-22 NOTE — PROGRESS NOTES
Visit Information    Have you changed or started any medications since your last visit including any over-the-counter medicines, vitamins, or herbal medicines? no   Are you having any side effects from any of your medications? -  no  Have you stopped taking any of your medications? Is so, why? -  no    Have you seen any other physician or provider since your last visit? No  Have you had any other diagnostic tests since your last visit? No  Have you been seen in the emergency room and/or had an admission to a hospital since we last saw you? No  Have you had your routine dental cleaning in the past 6 months? no    Have you activated your [employer] account? If not, what are your barriers?  Yes     Patient Care Team:  Valentine Rogers PA-C as PCP - General (Family Medicine)  Valentine Rogers PA-C as PCP - [facility] Provider    Medical History Review  Past Medical, Family, and Social History reviewed and  contribute to the patient presenting condition    Health Maintenance   Topic Date Due    Hepatitis C screen  Never done    Varicella vaccine (1 of 2 - 2-dose childhood series) Never done    Depression Monitoring  Never done    Cervical cancer screen  09/28/2020    COVID-19 Vaccine (3 - Booster for Existence Before Essence series) 09/20/2021    DTaP/Tdap/Td vaccine (2 - Td or Tdap) 07/24/2028    Flu vaccine  Completed    HIV screen  Completed    Hepatitis A vaccine  Aged Out    Hepatitis B vaccine  Aged Out    Hib vaccine  Aged Out    Meningococcal (ACWY) vaccine  Aged Out    Pneumococcal 0-64 years Vaccine  Aged Out

## 2022-03-02 ENCOUNTER — HOSPITAL ENCOUNTER (OUTPATIENT)
Dept: NEUROLOGY | Age: 32
Discharge: HOME | End: 2022-03-02
Payer: MEDICARE

## 2022-03-02 DIAGNOSIS — R42 DIZZINESS: ICD-10-CM

## 2022-03-02 DIAGNOSIS — R20.0 NUMBNESS AND TINGLING OF BOTH UPPER EXTREMITIES: ICD-10-CM

## 2022-03-02 DIAGNOSIS — R20.2 NUMBNESS AND TINGLING OF BOTH UPPER EXTREMITIES: ICD-10-CM

## 2022-03-02 PROCEDURE — 95885 MUSC TST DONE W/NERV TST LIM: CPT | Performed by: PHYSICAL MEDICINE & REHABILITATION

## 2022-03-02 PROCEDURE — 95909 NRV CNDJ TST 5-6 STUDIES: CPT | Performed by: PHYSICAL MEDICINE & REHABILITATION

## 2022-03-02 PROCEDURE — 95886 MUSC TEST DONE W/N TEST COMP: CPT | Performed by: PHYSICAL MEDICINE & REHABILITATION

## 2022-03-08 ENCOUNTER — OFFICE VISIT (OUTPATIENT)
Dept: NEUROSURGERY | Age: 32
End: 2022-03-08
Payer: MEDICARE

## 2022-03-08 VITALS
TEMPERATURE: 98.1 F | WEIGHT: 189.6 LBS | RESPIRATION RATE: 18 BRPM | HEIGHT: 64 IN | DIASTOLIC BLOOD PRESSURE: 63 MMHG | BODY MASS INDEX: 32.37 KG/M2 | SYSTOLIC BLOOD PRESSURE: 92 MMHG | HEART RATE: 64 BPM | OXYGEN SATURATION: 98 %

## 2022-03-08 DIAGNOSIS — G56.01 RIGHT CARPAL TUNNEL SYNDROME: ICD-10-CM

## 2022-03-08 DIAGNOSIS — M47.22 CERVICAL SPONDYLOSIS WITH RADICULOPATHY: Primary | ICD-10-CM

## 2022-03-08 PROCEDURE — G8427 DOCREV CUR MEDS BY ELIG CLIN: HCPCS | Performed by: NURSE PRACTITIONER

## 2022-03-08 PROCEDURE — G8482 FLU IMMUNIZE ORDER/ADMIN: HCPCS | Performed by: NURSE PRACTITIONER

## 2022-03-08 PROCEDURE — 1036F TOBACCO NON-USER: CPT | Performed by: NURSE PRACTITIONER

## 2022-03-08 PROCEDURE — 99213 OFFICE O/P EST LOW 20 MIN: CPT | Performed by: NURSE PRACTITIONER

## 2022-03-08 PROCEDURE — G8417 CALC BMI ABV UP PARAM F/U: HCPCS | Performed by: NURSE PRACTITIONER

## 2022-03-08 NOTE — PROGRESS NOTES
[address]  [facility] [address] [facility] [address]  Dept: [phone]    Patient:  Harika Steve  YOB: 1990  Date: 1/11/22    The patient is a 32 y.o. female who presents today for consult of the following problems:     Chief Complaint   Patient presents with    Follow-up         HPI:     Harika Steve is a 32 y.o. female who presents for follow-up of dizziness, numbness and tingling to upper extremities. Patient was previously evaluated for left sided neck and shoulder pain. Symptoms independently resolved until recently. States that her left shoulder pain has returned. It can be quite severe at times. It is particularly bothersome at night when trying to sleep on her left side. It is improved with staying active certain movements. She is able to trigger the discomfort with lateral rotation and lateral flexion to the left side. Has tried topical Biofreeze, ibuprofen with only temporary relief. Reports that the numbness and tingling she was feeling in her hands actually seems to have improved. Also states her recent 1 week flareup of right-sided \"sciatica\" after sitting in a car for a period of time traveling. Was able to recently complete EMG, here for review. History:     Past Medical History:   Diagnosis Date    Anxiety     Borderline personality disorder ([address]     Cervical spondylosis     Depression     Dysphagia     Latex allergy     Psoriasis      History reviewed. No pertinent surgical history. History reviewed. No pertinent family history.   Current Outpatient Medications on File Prior to Visit   Medication Sig Dispense Refill    Probiotic Acidophilus (FLORANEX) TABS Take 1 tablet by mouth daily 30 tablet 3    OXcarbazepine (TRILEPTAL) 150 MG tablet take 1 tablet by mouth twice a day 60 tablet 1    dicyclomine (BENTYL) 10 MG capsule Take 1 capsule by mouth 4 times daily (Patient not taking: Reported on 3/8/2022) 30 capsule 1    [DISCONTINUED] meclizine (ANTIVERT) 25 MG tablet Take 1 tablet by mouth 3 times daily as needed for Dizziness 30 tablet 0    hydrOXYzine (ATARAX) 10 MG tablet Take 10 mg by mouth every 8 hours as needed for Itching (Patient not taking: Reported on 3/8/2022)      clonazePAM (KLONOPIN) 0.25 MG disintegrating tablet Take 1 tablet by mouth nightly as needed for Anxiety for up to 10 days. 10 tablet 0     No current facility-administered medications on file prior to visit. Social History     Tobacco Use    Smoking status: Never Smoker    Smokeless tobacco: Never Used   Vaping Use    Vaping Use: Never used   Substance Use Topics    Alcohol use: Yes     Comment: once a year    Drug use: No       Allergies   Allergen Reactions    Latex     Pollen Extract        Review of Systems  Constitutional: Negative for activity change and appetite change. HENT: Negative for ear pain and facial swelling. Eyes: Negative for discharge and itching. Respiratory: Negative for choking and chest tightness. Cardiovascular: Negative for chest pain and leg swelling. Gastrointestinal: Negative for nausea and abdominal pain. Endocrine: Negative for cold intolerance and heat intolerance. Genitourinary: Negative for frequency and flank pain. Musculoskeletal: Negative for myalgias and joint swelling. Skin: Negative for rash and wound. Allergic/Immunologic: Negative for environmental allergies and food allergies. Hematological: Negative for adenopathy. Does not bruise/bleed easily. Psychiatric/Behavioral: Negative for self-injury. The patient is not nervous/anxious.       Physical Exam:      BP 92/63 (Site: Right Upper Arm, Position: Sitting, Cuff Size: Large Adult)   Pulse 64   Temp 98.1 °F (36.7 °C) (Temporal)   Resp 18   Ht 5' 4\" (1.626 m)   Wt 189 lb 9.6 oz (86 kg)   SpO2 98%   BMI 32.54 kg/m²   Estimated body mass index is 32.54 kg/m² as calculated from the following:    Height as of this encounter: 5' 4\" (1.626 m). Weight as of this encounter: 189 lb 9.6 oz (86 kg). General:  Lon Dos Santos is a 32y.o. year old female who appears her stated age. HEENT: Normocephalic atraumatic. Neck supple. Chest: regular rate; pulses equal  Abdomen: Soft nontender nondistended. Ext: DP and PT pulses 2+, good cap refill  Neuro    Mentation  Appropriate affect  Registration intact  Orientation intact  3 item recall intact  Judgement intact to situation    Cranial Nerves:   Pupils equal and reactive to light  Extraocular motion intact  Face and shrug symmetric  Tongue midline  No dysarthria  v1-3 sensation symmetric, masseter tone symmetric  Hearing symmetric    Sensation: Intact    Motor  L deltoid 5/5; R deltoid 5/5  L biceps 5/5; R biceps 5/5  L triceps 5/5; R triceps 5/5  L wrist extension 5/5; R wrist extension 5/5  L intrinsics 5/5; R intrinsics 5/5     L iliopsoas 5/5 , R iliopsoas 5/5  L quadriceps 5/5; R quadriceps 5/5  L Dorsiflexion 5/5; R dorsiflexion 5/5  L Plantarflexion 5/5; R plantarflexion 5/5  L EHL 5/5; R EHL 5/5    Reflexes  L Brachioradialis 2+/4; R brachioradialis 2+/4  L Biceps 2+/4; R Biceps 2+/4  L Triceps 2+/4; R Triceps 2+/4  L Patellar 2+/4: R Patellar 2+/4  L Achilles 2+/4; R Achilles 2+/4    hoffmans L: neg  hoffmans R: neg  Clonus L: neg  Clonus R: neg  Babinski L: neg  Babinski R: neg     + Tenderness to palpation over anterior left shoulder  + Spurling's left side    Studies Review:     EMG 3/2/2022:    Physical therapy notes reviewed    Assessment and Plan:      1. Cervical spondylosis with radiculopathy    2. Right carpal tunnel syndrome          Plan: Primary concern today is return of left sided cervical and shoulder pain. Still awaiting images from cervical MRI for review. EMG showing only mild right carpal tunnel.   Recommend trial of nightly bracing should this become bothersome to her. We will also provide referral for pain management evaluation for consideration of cervical epidural injection to see if this provides any additional relief from neck and shoulder pain. Maintain upcoming neurology appointment as planned. We will see the patient back in approximately 12 weeks. Followup: Return in about 3 months (around 6/8/2022), or if symptoms worsen or fail to improve. Prescriptions Ordered:  No orders of the defined types were placed in this encounter. Orders Placed:  Orders Placed This Encounter   Procedures   Marvin Gamez MD, Pain Management, [place]     Referral Priority:   Routine     Referral Type:   Eval and Treat     Referral Reason:   Specialty Services Required     Referred to Provider:   Nella Rincon MD     Requested Specialty:   Pain Management     Number of Visits Requested:   1        Electronically signed by BRIGHT Richter CNP on 3/8/2022 at 3:04 PM    Please note that this chart was generated using voice recognition Dragon dictation software. Although every effort was made to ensure the accuracy of this automated transcription, some errors in transcription may have occurred.

## 2022-03-24 ENCOUNTER — OFFICE VISIT (OUTPATIENT)
Dept: NEUROLOGY | Age: 32
End: 2022-03-24
Payer: MEDICARE

## 2022-03-24 VITALS
HEIGHT: 64 IN | WEIGHT: 189 LBS | HEART RATE: 82 BPM | DIASTOLIC BLOOD PRESSURE: 74 MMHG | BODY MASS INDEX: 32.27 KG/M2 | SYSTOLIC BLOOD PRESSURE: 105 MMHG

## 2022-03-24 DIAGNOSIS — R20.2 PARESTHESIAS: Primary | ICD-10-CM

## 2022-03-24 DIAGNOSIS — M25.512 LEFT SHOULDER PAIN, UNSPECIFIED CHRONICITY: ICD-10-CM

## 2022-03-24 DIAGNOSIS — M54.2 NECK PAIN: ICD-10-CM

## 2022-03-24 PROCEDURE — G8427 DOCREV CUR MEDS BY ELIG CLIN: HCPCS | Performed by: STUDENT IN AN ORGANIZED HEALTH CARE EDUCATION/TRAINING PROGRAM

## 2022-03-24 PROCEDURE — 99204 OFFICE O/P NEW MOD 45 MIN: CPT | Performed by: STUDENT IN AN ORGANIZED HEALTH CARE EDUCATION/TRAINING PROGRAM

## 2022-03-24 PROCEDURE — G8482 FLU IMMUNIZE ORDER/ADMIN: HCPCS | Performed by: STUDENT IN AN ORGANIZED HEALTH CARE EDUCATION/TRAINING PROGRAM

## 2022-03-24 PROCEDURE — G8417 CALC BMI ABV UP PARAM F/U: HCPCS | Performed by: STUDENT IN AN ORGANIZED HEALTH CARE EDUCATION/TRAINING PROGRAM

## 2022-03-24 PROCEDURE — 1036F TOBACCO NON-USER: CPT | Performed by: STUDENT IN AN ORGANIZED HEALTH CARE EDUCATION/TRAINING PROGRAM

## 2022-03-24 ASSESSMENT — ENCOUNTER SYMPTOMS
VOMITING: 0
ABDOMINAL PAIN: 0
DIARRHEA: 0
WHEEZING: 0
COUGH: 0
SHORTNESS OF BREATH: 0
CHEST TIGHTNESS: 0
NAUSEA: 0
APNEA: 0
CONSTIPATION: 0
ABDOMINAL DISTENTION: 0

## 2022-03-24 NOTE — PROGRESS NOTES
[address] [place] [address]  [facility] [address] [facility]  Dept: [phone]  Dept Fax: [phone]    NEUROLOGY NEW PATIENT NOTE       PATIENT NAME: Julio César Nicole  PATIENT MRN: [id number]  PRIMARY CARE PHYSICIAN: Merari Winters PA-C      HPI:      Julio César Nicole is a 32 y.o. female with PMH of autism spectrum disorder, presents today for evaluation for left shoulder pain. Patient states the shoulder pain started spontaneously last summer around 7-8/10 in intensity at the time patient unable to describe the character of the pain despite prompting, denies any radiation, states the pain is aggravated by keeping the arm still or lying still, also worsened by lateral or posterior movement of the arm at the shoulder joint. The pain lasted for about 3 to 4 months and then started to improve and gradually resolved over 3 to 4 weeks around October 2021. Subsequently patient felt fine and denied any issues with the shoulder. The pain recurred around 1 month ago, although has been milder in intensity compared to last year, 4-5/10. She states the pain continues to be extremely bothersome, is significantly worse at night when she is lying in bed and trying to go to bed. The pain is at worst in the morning and gradually gets better as she gets out of bed and starts moving around. She has been using ibuprofen and Biofreeze gel, endorses significant relief with both medications. She uses ibuprofen as needed, has been using it almost every day. Patient denies any traumatic injuries or strenuous activities prior to onset of the shoulder pain last year or 1 month ago. Patient also expresses some back pain and states she is scheduled to see a pain management physician in a few days. The pain started spontaneously after she took a long drive with her fiancé. She is working on being more physically active and is trying yoga to help with the pain.   She currently works as a writer, denies any smoking, alcohol or other drug use. States her prior job used to be very stressful and she left it about 2 days ago, has been doing better since then. PREVIOUS WORKUP:     Lab Results   Component Value Date    WBC 11.4 (H) 11/09/2021    HGB 13.5 11/09/2021    HCT 42.7 11/09/2021    MCV 88.0 11/09/2021     11/09/2021       Past Medical History:   Diagnosis Date    Anxiety     Borderline personality disorder ([facility] [address]     Cervical spondylosis     Depression     Dysphagia     Latex allergy     Psoriasis         History reviewed. No pertinent surgical history. Social History     Socioeconomic History    Marital status: Single     Spouse name: Not on file    Number of children: Not on file    Years of education: Not on file    Highest education level: Not on file   Occupational History    Not on file   Tobacco Use    Smoking status: Never Smoker    Smokeless tobacco: Never Used   Vaping Use    Vaping Use: Never used   Substance and Sexual Activity    Alcohol use: Yes     Comment: once a year    Drug use: No    Sexual activity: Not on file   Other Topics Concern    Not on file   Social History Narrative    Not on file     Social Determinants of Health     Financial Resource Strain:     Difficulty of Paying Living Expenses: Not on file   Food Insecurity:     Worried About Running Out of Food in the Last Year: Not on file    Jorge of Food in the Last Year: Not on file   Transportation Needs:     Lack of Transportation (Medical): Not on file    Lack of Transportation (Non-Medical):  Not on file   Physical Activity:     Days of Exercise per Week: Not on file    Minutes of Exercise per Session: Not on file   Stress:     Feeling of Stress : Not on file   Social Connections:     Frequency of Communication with Friends and Family: Not on file    Frequency of Social Gatherings with Friends and Family: Not on file    Attends Mandaen Services: Not on file   [facility] [employer] or Organizations: Not on file    Attends Club or Organization Meetings: Not on file    Marital Status: Not on file   Intimate Partner Violence:     Fear of Current or Ex-Partner: Not on file    Emotionally Abused: Not on file    Physically Abused: Not on file    Sexually Abused: Not on file   Housing Stability:     Unable to Pay for Housing in the Last Year: Not on file    Number of [place] in the Last Year: Not on file    Unstable Housing in the Last Year: Not on file        Current Outpatient Medications   Medication Sig Dispense Refill    Probiotic Acidophilus (FLORANEX) TABS Take 1 tablet by mouth daily 30 tablet 3    clonazePAM (KLONOPIN) 0.25 MG disintegrating tablet Take 1 tablet by mouth nightly as needed for Anxiety for up to 10 days. 10 tablet 0    OXcarbazepine (TRILEPTAL) 150 MG tablet take 1 tablet by mouth twice a day (Patient taking differently: Take by mouth daily ) 60 tablet 1    dicyclomine (BENTYL) 10 MG capsule Take 1 capsule by mouth 4 times daily (Patient not taking: Reported on 3/8/2022) 30 capsule 1     No current facility-administered medications for this visit. Allergies   Allergen Reactions    Latex     Pollen Extract         REVIEW OF SYSTEMS:     Review of Systems   Constitutional: Negative for activity change, appetite change, chills, diaphoresis and fever. Respiratory: Negative for apnea, cough, chest tightness, shortness of breath and wheezing. Cardiovascular: Negative for chest pain and palpitations. Gastrointestinal: Negative for abdominal distention, abdominal pain, constipation, diarrhea, nausea and vomiting. Genitourinary: Negative for difficulty urinating, dysuria and flank pain. Musculoskeletal: Negative for arthralgias and myalgias. Left shoulder pain   Neurological: Positive for light-headedness. Negative for dizziness and headaches. Psychiatric/Behavioral: Negative for agitation and confusion. All other systems reviewed and are negative. VITALS  /74 (Site: Left Lower Arm, Position: Sitting, Cuff Size: Medium Adult)   Pulse 82   Ht 5' 4\" (1.626 m)   Wt 189 lb (85.7 kg)   BMI 32.44 kg/m²      PHYSICAL EXAMINATION:     Physical Exam   General appearance: cooperative  Skin: no rash or skin lesions. HEENT: normocephalic  Optic Fundi: deferred  Neck: supple, no cervcical adenopathy or carotid bruit  Lungs: clear to auscultation  Heart: Regular rate and rhythm, normal S1, S2. No murmurs, clicks or gallops.   Peripheral pulses: radial pulses palpable  Abdominal: BS present, soft, NT, ND  Extremities: no edema    NEUROLOGICAL EXAMINATION:     GENERAL  Appears comfortable and in no distress   HEENT  NC/ AT   HEART  S1 and S2 heard; palpation of pulses: radial pulse    NECK  Supple and no bruits heard   MENTAL STATUS:  Alert, oriented, intact memory, no confusion, normal speech, normal language, no hallucination or delusion   CRANIAL NERVES: II     -      Visual fields intact to confrontation  III,IV,VI -  PERR, EOMs full, no ptosis  V     -     Normal facial sensation   VII    -     Normal facial symmetry  VIII   -     Intact hearing   IX,X -     Symmetrical palate  XI    -     Symmetrical shoulder shrug  XII   -     Midline tongue, no atrophy    MOTOR FUNCTION: RUE: Significant for good strength of grade 5/5 in proximal and distal muscle groups   LUE: Significant for good strength of grade 5/5 in proximal and distal muscle groups   RLE: Significant for good strength of grade 5/5 in proximal and distal muscle groups   LLE: Significant for good strength of grade 5/5 in proximal and distal muscle groups      Normal bulk, normal tone and no involuntary movements, no tremor   SENSORY FUNCTION:  Normal touch, normal pin, normal vibration, normal proprioception   CEREBELLAR FUNCTION:  Intact fine motor control over upper limbs and lower limbs   REFLEX FUNCTION:  Symmetric in upper and lower extremities, no Babinski sign   STATION and GAIT  Normal gait and tandem station, normal tip toes and heel walking     IMAGIN. X-ray left shoulder 2021: Unremarkable  2. 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 Otherwise unremarkable. ASSESSMENT:      Case of a 77-year-old female with history of left shoulder pain since 2021, initially lasted till October and then gradually resolved, recurred about 1 month ago. Used to be severe in intensity, currently moderate intensity but present all the time and bothersome. Pain decreased with physical movement of the shoulder and arm, pain worsened with lateral and posterior movement. She has been using ibuprofen and Biofreeze with relief. Was seen by neurosurgery and diagnosed with mild carpal tunnel syndrome on the right on EMG. X-ray shoulder from last year was unremarkable. Will order MRI cervical spine.    //Left shoulder pain    PLAN:     1. MRI cervical spine without contrast ordered  2. Wrist splint ordered for right-sided carpal tunnel syndrome  3. Physical therapy consult for left shoulder and back pain  4. Patient advised to contact PCP for shoulder imaging  5. Return to neurology office in 4 weeks after completion of imaging    Ms. Viraj Grey received counseling on the following healthy behaviors: medical compliance, smoking cessation, blood pressure control, regular follow up with primary doctor.         Electronically signed by Tino Mayen MD on 3/24/2022 at 9:41 AM

## 2022-05-12 ENCOUNTER — TELEPHONE (OUTPATIENT)
Dept: NEUROLOGY | Age: 32
End: 2022-05-12

## 2022-05-26 ENCOUNTER — TELEPHONE (OUTPATIENT)
Dept: NEUROSURGERY | Age: 32
End: 2022-05-26

## 2022-05-26 NOTE — TELEPHONE ENCOUNTER
Patient has open order for PT from Dr Lanette Vigil, she can contact them to reschedule her evaluation.     [facility]   [facility] [address] [place] [address]   [phone]

## 2022-05-26 NOTE — TELEPHONE ENCOUNTER
Patient called and stated Tracy Smalls asked her to give her a call regarding scheduling. physical therapy. Patient is requesting a call back regarding scheduling physical therapy.

## 2022-07-29 ENCOUNTER — NURSE TRIAGE (OUTPATIENT)
Dept: OTHER | Facility: CLINIC | Age: 32
End: 2022-07-29

## 2022-07-29 ENCOUNTER — HOSPITAL ENCOUNTER (EMERGENCY)
Age: 32
Discharge: HOME | End: 2022-07-29
Attending: EMERGENCY MEDICINE
Payer: MEDICARE

## 2022-07-29 VITALS
RESPIRATION RATE: 16 BRPM | TEMPERATURE: 97.7 F | SYSTOLIC BLOOD PRESSURE: 100 MMHG | HEIGHT: 64 IN | OXYGEN SATURATION: 100 % | HEART RATE: 78 BPM | BODY MASS INDEX: 31.58 KG/M2 | DIASTOLIC BLOOD PRESSURE: 67 MMHG | WEIGHT: 185 LBS

## 2022-07-29 DIAGNOSIS — I49.8 BIGEMINY: Primary | ICD-10-CM

## 2022-07-29 DIAGNOSIS — F41.1 ANXIETY STATE: ICD-10-CM

## 2022-07-29 LAB
ABSOLUTE EOS #: 0.05 K/UL (ref 0–0.44)
ABSOLUTE IMMATURE GRANULOCYTE: 0.05 K/UL (ref 0–0.3)
ABSOLUTE LYMPH #: 2.28 K/UL (ref 1.1–3.7)
ABSOLUTE MONO #: 0.79 K/UL (ref 0.1–1.2)
ANION GAP SERPL CALCULATED.3IONS-SCNC: 14 MMOL/L (ref 9–17)
BASOPHILS # BLD: 0 % (ref 0–2)
BASOPHILS ABSOLUTE: 0.03 K/UL (ref 0–0.2)
BUN BLDV-MCNC: 17 MG/DL (ref 6–20)
BUN/CREAT BLD: 23 (ref 9–20)
CALCIUM SERPL-MCNC: 9.6 MG/DL (ref 8.6–10.4)
CHLORIDE BLD-SCNC: 104 MMOL/L (ref 98–107)
CO2: 24 MMOL/L (ref 20–31)
CREAT SERPL-MCNC: 0.74 MG/DL (ref 0.5–0.9)
EKG ATRIAL RATE: 77 BPM
EKG P AXIS: 71 DEGREES
EKG P-R INTERVAL: 124 MS
EKG Q-T INTERVAL: 402 MS
EKG QRS DURATION: 84 MS
EKG QTC CALCULATION (BAZETT): 454 MS
EKG R AXIS: 101 DEGREES
EKG T AXIS: 60 DEGREES
EKG VENTRICULAR RATE: 77 BPM
EOSINOPHILS RELATIVE PERCENT: 1 % (ref 1–4)
GFR AFRICAN AMERICAN: >60 ML/MIN
GFR NON-AFRICAN AMERICAN: >60 ML/MIN
GFR SERPL CREATININE-BSD FRML MDRD: ABNORMAL ML/MIN/{1.73_M2}
GLUCOSE BLD-MCNC: 92 MG/DL (ref 70–99)
HCT VFR BLD CALC: 48.5 % (ref 36.3–47.1)
HEMOGLOBIN: 15.1 G/DL (ref 11.9–15.1)
IMMATURE GRANULOCYTES: 1 %
LYMPHOCYTES # BLD: 21 % (ref 24–43)
MAGNESIUM: 2.2 MG/DL (ref 1.6–2.6)
MCH RBC QN AUTO: 27.2 PG (ref 25.2–33.5)
MCHC RBC AUTO-ENTMCNC: 31.1 G/DL (ref 28.4–34.8)
MCV RBC AUTO: 87.4 FL (ref 82.6–102.9)
MONOCYTES # BLD: 7 % (ref 3–12)
NRBC AUTOMATED: 0 PER 100 WBC
PDW BLD-RTO: 13.1 % (ref 11.8–14.4)
PLATELET # BLD: 214 K/UL (ref 138–453)
PMV BLD AUTO: 10.5 FL (ref 8.1–13.5)
POTASSIUM SERPL-SCNC: 3.6 MMOL/L (ref 3.7–5.3)
RBC # BLD: 5.55 M/UL (ref 3.95–5.11)
SEG NEUTROPHILS: 70 % (ref 36–65)
SEGMENTED NEUTROPHILS ABSOLUTE COUNT: 7.66 K/UL (ref 1.5–8.1)
SODIUM BLD-SCNC: 142 MMOL/L (ref 135–144)
THYROXINE, FREE: 1.18 NG/DL (ref 0.93–1.7)
TSH SERPL DL<=0.05 MIU/L-ACNC: 2.23 UIU/ML (ref 0.3–5)
WBC # BLD: 10.9 K/UL (ref 3.5–11.3)

## 2022-07-29 PROCEDURE — 6370000000 HC RX 637 (ALT 250 FOR IP): Performed by: PHYSICIAN ASSISTANT

## 2022-07-29 PROCEDURE — 85025 COMPLETE CBC W/AUTO DIFF WBC: CPT

## 2022-07-29 PROCEDURE — 83735 ASSAY OF MAGNESIUM: CPT

## 2022-07-29 PROCEDURE — 99284 EMERGENCY DEPT VISIT MOD MDM: CPT

## 2022-07-29 PROCEDURE — 80048 BASIC METABOLIC PNL TOTAL CA: CPT

## 2022-07-29 PROCEDURE — 84443 ASSAY THYROID STIM HORMONE: CPT

## 2022-07-29 PROCEDURE — 93005 ELECTROCARDIOGRAM TRACING: CPT | Performed by: PHYSICIAN ASSISTANT

## 2022-07-29 PROCEDURE — 84439 ASSAY OF FREE THYROXINE: CPT

## 2022-07-29 RX ORDER — LORAZEPAM 0.5 MG/1
0.5 TABLET ORAL ONCE
Status: COMPLETED | OUTPATIENT
Start: 2022-07-29 | End: 2022-07-29

## 2022-07-29 RX ORDER — LORAZEPAM 0.5 MG/1
0.5 TABLET ORAL 2 TIMES DAILY PRN
Qty: 14 TABLET | Refills: 0 | Status: SHIPPED | OUTPATIENT
Start: 2022-07-29 | End: 2022-08-05

## 2022-07-29 RX ADMIN — LORAZEPAM 0.5 MG: 0.5 TABLET ORAL at 11:34

## 2022-07-29 ASSESSMENT — PAIN - FUNCTIONAL ASSESSMENT: PAIN_FUNCTIONAL_ASSESSMENT: NONE - DENIES PAIN

## 2022-07-29 NOTE — ED PROVIDER NOTES
[address] [place] [address] ED  [facility]      Pt Name: Chadwick Sofia  MRN: [id number]  Hunggf[address]em 1990  Date ofevaluation: 7/29/2022  Provider: Stella Moore Dr       Chief Complaint   Patient presents with    Palpitations         HISTORY OF PRESENT ILLNESS  (Location/Symptom, Timing/Onset, Context/Setting, Quality, Duration, Modifying Factors, Severity.)   Chadwick Sofia is a 32 y.o. female who presents to the emergency department with palpitations have ongoing for several weeks. Patient recently seen and prescribed metoprolol for the same symptoms at [facility]. Patient also reports that she has been very anxious and has never taken her Klonopin because it makes her too sleepy. Denies any syncope or presyncopal type feeling. No dizziness. No passing out. Nursing Notes were reviewed. ALLERGIES     Latex, Pollen extract, and Pineapple    CURRENT MEDICATIONS       Discharge Medication List as of 7/29/2022 12:18 PM        CONTINUE these medications which have NOT CHANGED    Details   METOPROLOL TARTRATE PO Take 15 mg by mouth 2 times dailyHistorical Med      HYDROXYZINE HCL PO Take by mouthHistorical Med      dicyclomine (BENTYL) 10 MG capsule Take 1 capsule by mouth 4 times daily, Disp-30 capsule, R-1Normal      clonazePAM (KLONOPIN) 0.25 MG disintegrating tablet Take 1 tablet by mouth nightly as needed for Anxiety for up to 10 days. , Disp-10 tablet, R-0Normal      OXcarbazepine (TRILEPTAL) 150 MG tablet take 1 tablet by mouth twice a day, Disp-60 tablet, R-1Normal             PAST MEDICAL HISTORY         Diagnosis Date    Anxiety     Borderline personality disorder ([facility] [address]     Cervical spondylosis     Depression     Dysphagia     Latex allergy     Psoriasis        SURGICAL HISTORY     History reviewed. No pertinent surgical history. FAMILY HISTORY     History reviewed. No pertinent family history.   Family Status   Relation Name Status    Mother  Alive    Father  Alive        SOCIAL HISTORY      reports that she has never smoked. She has never used smokeless tobacco. She reports current alcohol use. She reports that she does not use drugs. REVIEW OFSYSTEMS    (2-9 systems for level 4, 10 or more for level 5)   Review of Systems    Except as noted above the remainder of the review of systems was reviewed and negative. PHYSICAL EXAM    (up to 7 for level 4, 8 or more for level 5)     ED Triage Vitals [07/29/22 1049]   BP Temp Temp Source Heart Rate Resp SpO2 Height Weight   116/72 97.7 °F (36.5 °C) Oral 77 22 98 % 5' 4\" (1.626 m) 185 lb (83.9 kg)      Physical Exam  Constitutional:       Appearance: She is well-developed. HENT:      Head: Normocephalic and atraumatic. Cardiovascular:      Rate and Rhythm: Normal rate and regular rhythm. Pulmonary:      Effort: Pulmonary effort is normal.      Breath sounds: Normal breath sounds. Abdominal:      General: There is no distension. Palpations: Abdomen is soft. Tenderness: There is no abdominal tenderness. Musculoskeletal:         General: Normal range of motion. Cervical back: Normal range of motion and neck supple. Skin:     General: Skin is warm. Findings: No rash. Neurological:      Mental Status: She is alert and oriented to person, place, and time. Psychiatric:         Behavior: Behavior normal.               DIAGNOSTIC RESULTS     EKG: All EKG's are interpreted by the Emergency Department Physician who either signs or Co-signs this chart in the absence of a cardiologist.    Michalene Libman.  No st segment elevation    RADIOLOGY:   Non-plain film images such as CT, Ultrasound and MRI are read by the radiologist. Plain radiographic images arevisualized and preliminarily interpreted by the emergency physician with the below findings:        Interpretation per the Radiologist below, if available at thetime of this note:          ED BEDSIDE ULTRASOUND: note were completed with a voice recognition program.  Efforts were made to edit thedictations but occasionally words are mis-transcribed.)    REN Valencia PA-C  07/29/22 [address] [facility] Frederick Maharaj PA-C  07/29/22 2801

## 2022-07-29 NOTE — DISCHARGE INSTRUCTIONS
Take meds as prescribed. Follow up with doctor  in 3 -4 days.   Return to ER immediately if symptoms worsen or persist.  Do not drive, operate machinery, climb or engage in any dangerous activity while taking narcotics or ativan

## 2022-07-29 NOTE — TELEPHONE ENCOUNTER
Received call from [facility] at [facility] with [employer]. Subjective: Caller states \"heart palpitations\"     Current Symptoms:   Has PVCs  Lately its been \"out of control\"  Worsening in frequency, lasting longer, feels worse when it happens  Takes metoprolol  Debilitating when it happens, has had to call off work  [employer] out, feels like someone sitting on her chest  Lasts from a few minutes to all day  Passed out today, went to ER, needs ER f/u  No new or worsening symptoms, no triage indicated    Onset: 2 months ago; worsening    Associated Symptoms: NA    Pain Severity: denies, just pressure    Temperature: NA     What has been tried: cutting out caffeine and sugar, coffee intake down to 2-3 times a week    LMP:  2 weeks ago  Pregnant: No    Denies any other questions or concerns; instructed to call back for any new or worsening symptoms. Patient/Caller agrees with recommended disposition; writer provided warm transfer to [facility] at [facility] for appointment scheduling. Attention Provider: Thank you for allowing me to participate in the care of your patient. The patient was connected to triage in response to information provided to the ECC/PSC. Please do not respond through this encounter as the response is not directed to a shared pool. Reason for Disposition   Caller has already spoken with another triager and has no further questions.     Protocols used: No Contact or Duplicate Contact Call-ADULT-

## 2022-08-10 ENCOUNTER — OFFICE VISIT (OUTPATIENT)
Dept: FAMILY MEDICINE CLINIC | Age: 32
End: 2022-08-10
Payer: MEDICARE

## 2022-08-10 VITALS
DIASTOLIC BLOOD PRESSURE: 62 MMHG | BODY MASS INDEX: 32.27 KG/M2 | WEIGHT: 189 LBS | SYSTOLIC BLOOD PRESSURE: 110 MMHG | OXYGEN SATURATION: 98 % | HEART RATE: 89 BPM | HEIGHT: 64 IN

## 2022-08-10 DIAGNOSIS — H93.11 RIGHT-SIDED TINNITUS: ICD-10-CM

## 2022-08-10 DIAGNOSIS — F41.9 ANXIETY: Primary | ICD-10-CM

## 2022-08-10 DIAGNOSIS — R00.2 PALPITATIONS: ICD-10-CM

## 2022-08-10 PROCEDURE — 1036F TOBACCO NON-USER: CPT | Performed by: PHYSICIAN ASSISTANT

## 2022-08-10 PROCEDURE — G8417 CALC BMI ABV UP PARAM F/U: HCPCS | Performed by: PHYSICIAN ASSISTANT

## 2022-08-10 PROCEDURE — G8427 DOCREV CUR MEDS BY ELIG CLIN: HCPCS | Performed by: PHYSICIAN ASSISTANT

## 2022-08-10 PROCEDURE — 99213 OFFICE O/P EST LOW 20 MIN: CPT | Performed by: PHYSICIAN ASSISTANT

## 2022-08-10 ASSESSMENT — ENCOUNTER SYMPTOMS
COUGH: 0
SHORTNESS OF BREATH: 0
VOMITING: 0
WHEEZING: 0
COLOR CHANGE: 0
CONSTIPATION: 0
ABDOMINAL PAIN: 0
DIARRHEA: 0
NAUSEA: 0

## 2022-08-10 NOTE — PROGRESS NOTES
[address]  [facility]  [address]  [address] [facility] [address]  Dept: [phone]  Dept Fax: [phone]    Christiano Nj is a 32 y.o. female who presents today for her medical conditions/complaintsas noted below. Christiano Nj is c/o of   Chief Complaint   Patient presents with    Anxiety     Up and down    Follow-Up from Hospital     ER Follow up     Tinnitus     Right ear is ringing         HPI:     HPI    Patient here for follow up after recent ER visits for palpitations/anxiety. [facility] ER started her on metoprolol 25mg 1/2 tablet BID. She states that has helped limit the frequency of the palpitations. She had an appointment with [facility] cardiologist recently and has a stress test, echo and 1 week holter monitor planned. She was given klonopin for anxiety. Not taking it daily. She is currently working on getting in with a new psychiatrist. She also has vistaril for prn use, uses this very rarely. She is getting  in November which she is excited about. Also reporting some right ear ringing. This tends to happen when she gets wax obstruction in the ear. She is asking for us to take a look. She has used OTC wax softening drops recently. No results found for: LABA1C          ( goal A1Cis < 7)   No results found for: LABMICR  LDL Cholesterol (mg/dL)   Date Value   11/09/2021 49       (goal LDL is <100)   AST (U/L)   Date Value   11/09/2021 20     ALT (U/L)   Date Value   11/09/2021 15     BUN (mg/dL)   Date Value   07/29/2022 17     BP Readings from Last 3 Encounters:   08/10/22 110/62   07/29/22 100/67   03/24/22 105/74          (goal 120/80)    Past Medical History:   Diagnosis Date    Anxiety     [facility]     Cervical spondylosis     Depression     Dysphagia     Latex allergy     Psoriasis       History reviewed. No pertinent surgical history. History reviewed. No pertinent family history.     Social History     Tobacco Use    Smoking status: Never    Smokeless tobacco: Never   Substance Use Topics    Alcohol use: Yes     Comment: once a year      Current Outpatient Medications   Medication Sig Dispense Refill    METOPROLOL TARTRATE PO Take 25 mg by mouth daily Patient taking 1/2 tablet po BID      clonazePAM (KLONOPIN) 0.25 MG disintegrating tablet Take 1 tablet by mouth nightly as needed for Anxiety for up to 10 days. 10 tablet 0    OXcarbazepine (TRILEPTAL) 150 MG tablet take 1 tablet by mouth twice a day 60 tablet 1    HYDROXYZINE HCL PO Take by mouth (Patient not taking: Reported on 8/10/2022)       No current facility-administered medications for this visit. Allergies   Allergen Reactions    Latex     Pollen Extract     Pineapple Nausea And Vomiting       Health Maintenance   Topic Date Due    Varicella vaccine (1 of 2 - 2-dose childhood series) Never done    Hepatitis C screen  Never done    Cervical cancer screen  09/28/2020    COVID-19 Vaccine (3 - Booster for Pfizer series) 09/20/2021    Flu vaccine (1) 09/01/2022    Depression Monitoring  02/22/2023    DTaP/Tdap/Td vaccine (2 - Td or Tdap) 07/24/2028    HIV screen  Completed    Hepatitis A vaccine  Aged Out    Hepatitis B vaccine  Aged Out    Hib vaccine  Aged Out    Meningococcal (ACWY) vaccine  Aged Out    Pneumococcal 0-64 years Vaccine  Aged Out       Subjective:     Review of Systems   Constitutional:  Negative for activity change, appetite change, fatigue and fever. /62 (Site: Left Upper Arm, Position: Sitting, Cuff Size: Large Adult)   Pulse 89   Ht 5' 4\" (1.626 m)   Wt 189 lb (85.7 kg)   LMP 07/15/2022 (Approximate)   SpO2 98%   BMI 32.44 kg/m²    HENT:  Positive for tinnitus. Negative for ear discharge and ear pain. Respiratory:  Negative for cough, shortness of breath and wheezing. Cardiovascular:  Positive for palpitations. Negative for chest pain and leg swelling.    Gastrointestinal:  Negative for abdominal pain, constipation, diarrhea, nausea and vomiting. Skin:  Negative for color change and pallor. Neurological:  Negative for weakness. Psychiatric/Behavioral:  Negative for agitation, behavioral problems, self-injury, sleep disturbance and suicidal ideas. The patient is nervous/anxious. Objective:     Physical Exam  Vitals and nursing note reviewed. Constitutional:       General: She is not in acute distress. Appearance: Normal appearance. She is well-developed. She is not ill-appearing. HENT:      Head: Normocephalic and atraumatic. Right Ear: Tympanic membrane, ear canal and external ear normal. There is no impacted cerumen. Left Ear: Tympanic membrane, ear canal and external ear normal. There is no impacted cerumen. Cardiovascular:      Rate and Rhythm: Normal rate and regular rhythm. Heart sounds: No murmur heard. Pulmonary:      Effort: Pulmonary effort is normal. No respiratory distress. Breath sounds: Normal breath sounds. No wheezing, rhonchi or rales. Skin:     General: Skin is warm and dry. Coloration: Skin is not pale. Findings: No erythema or rash. Neurological:      Mental Status: She is alert and oriented to person, place, and time. Psychiatric:         Mood and Affect: Mood normal.         Behavior: Behavior normal.         Thought Content: Thought content normal.         Judgment: Judgment normal.     /62 (Site: Left Upper Arm, Position: Sitting, Cuff Size: Large Adult)   Pulse 89   Ht 5' 4\" (1.626 m)   Wt 189 lb (85.7 kg)   LMP 07/15/2022 (Approximate)   SpO2 98%   BMI 32.44 kg/m²     Assessment:       Diagnosis Orders   1. Anxiety        2. Palpitations        3. Right-sided tinnitus                  Plan:      Return in about 2 months (around 10/10/2022) for med review. ER work up reviewed  Continue present medications. Patient is feeling slightly better since being on metoprolol.   She has upcoming cardiac testing and follow-up with [facility] cardiology planned. I did give her the name and number for [facility] to get an appointment with a psychiatrist.  She agreed to schedule. No evidence of cerumen impaction on the right ear, no infection present. Patient has been having intermittent tinnitus, reassurance given, monitor. Recheck in 2 months after cardiac testing completed  Patient agreed with treatment plan      Patient given educational materials - see patient instructions. Discussed use, benefit, and side effects of prescribed medications. All patientquestions answered. Pt voiced understanding. Reviewed health maintenance. Instructedto continue current medications, diet and exercise. Patient agreed with treatmentplan. Follow up as directed. Please note that this chart was generated using voice recognition Dragon dictation software. Although every effort was made to ensure the accuracy of this automated transcription, some errors in transcription may have occurred.      Electronically signed by Asher Javier PA-C on 8/10/2022 at 11:30 AM

## 2022-08-10 NOTE — PROGRESS NOTES
Visit Information    Have you changed or started any medications since your last visit including any over-the-counter medicines, vitamins, or herbal medicines? no   Are you having any side effects from any of your medications? -  no  Have you stopped taking any of your medications? Is so, why? -  no    Have you seen any other physician or provider since your last visit? No  Have you had any other diagnostic tests since your last visit? No  Have you been seen in the emergency room and/or had an admission to a hospital since we last saw you? No  Have you had your routine dental cleaning in the past 6 months? no    Have you activated your [employer] account? If not, what are your barriers?  Yes     Patient Care Team:  Franca Horton PA-C as PCP - General (Family Medicine)  Franca oHrton PA-C as PCP - [facility]    Medical History Review  Past Medical, Family, and Social History reviewed and  contribute to the patient presenting condition    Health Maintenance   Topic Date Due    Varicella vaccine (1 of 2 - 2-dose childhood series) Never done    Hepatitis C screen  Never done    Cervical cancer screen  09/28/2020    COVID-19 Vaccine (3 - Booster for Pfizer series) 09/20/2021    Flu vaccine (1) 09/01/2022    Depression Monitoring  02/22/2023    DTaP/Tdap/Td vaccine (2 - Td or Tdap) 07/24/2028    HIV screen  Completed    Hepatitis A vaccine  Aged Out    Hepatitis B vaccine  Aged Out    Hib vaccine  Aged Out    Meningococcal (ACWY) vaccine  Aged Out    Pneumococcal 0-64 years Vaccine  Aged Out

## 2022-11-16 ENCOUNTER — OFFICE VISIT (OUTPATIENT)
Dept: FAMILY MEDICINE CLINIC | Age: 32
End: 2022-11-16
Payer: MEDICARE

## 2022-11-16 VITALS
DIASTOLIC BLOOD PRESSURE: 72 MMHG | HEART RATE: 74 BPM | HEIGHT: 64 IN | WEIGHT: 190 LBS | SYSTOLIC BLOOD PRESSURE: 104 MMHG | OXYGEN SATURATION: 98 % | TEMPERATURE: 97.4 F | BODY MASS INDEX: 32.44 KG/M2

## 2022-11-16 DIAGNOSIS — N92.6 IRREGULAR MENSES: ICD-10-CM

## 2022-11-16 DIAGNOSIS — Z30.09 CONTRACEPTIVE EDUCATION: Primary | ICD-10-CM

## 2022-11-16 DIAGNOSIS — R30.0 DYSURIA: ICD-10-CM

## 2022-11-16 LAB
BILIRUBIN, POC: ABNORMAL
BLOOD URINE, POC: ABNORMAL
CLARITY, POC: CLEAR
COLOR, POC: YELLOW
GLUCOSE URINE, POC: ABNORMAL
KETONES, POC: ABNORMAL
LEUKOCYTE EST, POC: ABNORMAL
NITRITE, POC: ABNORMAL
PH, POC: 5.5
PROTEIN, POC: ABNORMAL
SPECIFIC GRAVITY, POC: 1.03
UROBILINOGEN, POC: 0.2

## 2022-11-16 PROCEDURE — 99213 OFFICE O/P EST LOW 20 MIN: CPT | Performed by: PHYSICIAN ASSISTANT

## 2022-11-16 PROCEDURE — 1036F TOBACCO NON-USER: CPT | Performed by: PHYSICIAN ASSISTANT

## 2022-11-16 PROCEDURE — G8417 CALC BMI ABV UP PARAM F/U: HCPCS | Performed by: PHYSICIAN ASSISTANT

## 2022-11-16 PROCEDURE — G8427 DOCREV CUR MEDS BY ELIG CLIN: HCPCS | Performed by: PHYSICIAN ASSISTANT

## 2022-11-16 PROCEDURE — G8484 FLU IMMUNIZE NO ADMIN: HCPCS | Performed by: PHYSICIAN ASSISTANT

## 2022-11-16 PROCEDURE — 81003 URINALYSIS AUTO W/O SCOPE: CPT | Performed by: PHYSICIAN ASSISTANT

## 2022-11-16 ASSESSMENT — ENCOUNTER SYMPTOMS: COLOR CHANGE: 0

## 2022-11-16 NOTE — PROGRESS NOTES
[address] [facility]  [address] [facility] [address] [address]  Dept: [phone]  Dept Fax: [phone]    Gamaliel Small is a 28 y.o. female who presents today for her medical conditions/complaintsas noted below. Gamaliel Small is c/o of   Chief Complaint   Patient presents with    Menstrual Problem     irregular    Genital Pain     Irritation in genital area- she states she changed her cleaning habits and it may be the reason         HPI:     HPI    Patient states she missed her period in October the month before her wedding. She still felt some cramping and normal symptoms but no bleeding occurred. She ended up starting again the 1st week of November. She states symptoms have resolved since missing her menses. No plan to get pregnant in the near future. Trying to get a new job and wanting to get more financially stable first.   She is using condoms and has used plan b if needed. She is using a period tracker to help monitor ovulation. Patient states she had been having genital pain off and on for years. She thought it was a hygiene issue and since she has been cleaning better her symptoms are resolving. She has recently noticed burning and itching with urination  She has been urination and showering after intercourse. No results found for: LABA1C          ( goal A1Cis < 7)   No results found for: LABMICR  LDL Cholesterol (mg/dL)   Date Value   11/09/2021 49       (goal LDL is <100)   AST (U/L)   Date Value   11/09/2021 20     ALT (U/L)   Date Value   11/09/2021 15     BUN (mg/dL)   Date Value   07/29/2022 17     BP Readings from Last 3 Encounters:   11/16/22 104/72   08/10/22 110/62   07/29/22 100/67          (goal 120/80)    Past Medical History:   Diagnosis Date    Anxiety     Borderline personality disorder ([address]     Cervical spondylosis     Depression     Dysphagia     Latex allergy     Psoriasis       History reviewed.  No pertinent surgical history. History reviewed. No pertinent family history. Social History     Tobacco Use    Smoking status: Never    Smokeless tobacco: Never   Substance Use Topics    Alcohol use: Yes     Comment: once a year      Current Outpatient Medications   Medication Sig Dispense Refill    Probiotic Product (PROBIOTIC BLEND PO) Take by mouth      METOPROLOL TARTRATE PO Take 25 mg by mouth daily Patient taking 1/2 tablet po BID      HYDROXYZINE HCL PO Take by mouth      clonazePAM (KLONOPIN) 0.25 MG disintegrating tablet Take 1 tablet by mouth nightly as needed for Anxiety for up to 10 days. 10 tablet 0    OXcarbazepine (TRILEPTAL) 150 MG tablet take 1 tablet by mouth twice a day 60 tablet 1     No current facility-administered medications for this visit. Allergies   Allergen Reactions    Latex     Pollen Extract     Pineapple Nausea And Vomiting       Health Maintenance   Topic Date Due    Varicella vaccine (1 of 2 - 2-dose childhood series) Never done    Hepatitis C screen  Never done    Cervical cancer screen  09/28/2020    COVID-19 Vaccine (3 - Booster for Pfizer series) 06/15/2021    Flu vaccine (1) 08/01/2022    Depression Monitoring  02/22/2023    DTaP/Tdap/Td vaccine (2 - Td or Tdap) 07/24/2028    HIV screen  Completed    Hepatitis A vaccine  Aged Out    Hib vaccine  Aged Out    Meningococcal (ACWY) vaccine  Aged Out    Pneumococcal 0-64 years Vaccine  Aged Out       Subjective:     Review of Systems   Constitutional:  Negative for activity change, appetite change, fatigue, fever and unexpected weight change. Genitourinary:  Positive for dysuria. Negative for flank pain, frequency, hematuria and urgency. Skin:  Negative for color change, pallor, rash and wound. Neurological:  Negative for weakness and numbness. Hematological:  Negative for adenopathy. Psychiatric/Behavioral:  The patient is not nervous/anxious. Objective:     Physical Exam  Vitals and nursing note reviewed. Constitutional:       General: She is not in acute distress. Appearance: Normal appearance. She is well-developed. She is not ill-appearing. HENT:      Head: Normocephalic and atraumatic. Skin:     General: Skin is warm and dry. Coloration: Skin is not pale. Findings: No erythema or rash. Neurological:      Mental Status: She is alert and oriented to person, place, and time. Psychiatric:         Mood and Affect: Mood normal.         Behavior: Behavior normal.         Thought Content: Thought content normal.         Judgment: Judgment normal.     /72 (Site: Left Upper Arm, Position: Sitting, Cuff Size: Large Adult)   Pulse 74   Temp 97.4 °F (36.3 °C) (Tympanic)   Ht 5' 4\" (1.626 m)   Wt 190 lb (86.2 kg)   SpO2 98%   BMI 32.61 kg/m²     Assessment:       Diagnosis Orders   1. Contraceptive education  Jonathan Lopez MD, GynecologyRandy      2. Irregular menses  Jonathan Lopez MD, GynecologyRandy      3. Dysuria  POCT Urinalysis No Micro (Auto)                Plan:      Return if symptoms worsen or fail to improve. Patient interested in seeing GYN for pregnancy/contraceptive discussion. I did advise her she is on a few higher risk medications for pregnancy. She is aware of this. they are using condoms as well as ovulation monitoring. Referral given, she will call for an appointment  Patient just coming off her menses now. Urine negative other than blood.   Patient declined GYN exam today, will make appointment with gynecologist    Orders Placed This Encounter   Procedures    Jonathan Lopez MD, GynecologyRandy     Referral Priority:   Routine     Referral Type:   Eval and Treat     Referral Reason:   Specialty Services Required     Referred to Provider:   Yulia Sanchez MD     Requested Specialty:   Obstetrics & Gynecology     Number of Visits Requested:   1    POCT Urinalysis No Micro (Auto)         Patient given educational materials - see patient instructions. Discussed use, benefit, and side effects of prescribed medications. All patientquestions answered. Pt voiced understanding. Reviewed health maintenance. Instructedto continue current medications, diet and exercise. Patient agreed with treatmentplan. Follow up as directed. Please note that this chart was generated using voice recognition Dragon dictation software. Although every effort was made to ensure the accuracy of this automated transcription, some errors in transcription may have occurred.      Electronically signed by Samantha Paulino PA-C on 11/16/2022 at 3:44 PM

## 2022-11-16 NOTE — PROGRESS NOTES
Visit Information    Have you changed or started any medications since your last visit including any over-the-counter medicines, vitamins, or herbal medicines? no   Are you having any side effects from any of your medications? -  no  Have you stopped taking any of your medications? Is so, why? -  no    Have you seen any other physician or provider since your last visit? No  Have you had any other diagnostic tests since your last visit? No  Have you been seen in the emergency room and/or had an admission to a hospital since we last saw you? No  Have you had your routine dental cleaning in the past 6 months? no    Have you activated your [employer] account? If not, what are your barriers?  Yes     Patient Care Team:  Eliezer Mcdonough PA-C as PCP - General (Family Medicine)  Eliezer Mcdonough PA-C as PCP - [facility]    Medical History Review  Past Medical, Family, and Social History reviewed and  contribute to the patient presenting condition    Health Maintenance   Topic Date Due    Varicella vaccine (1 of 2 - 2-dose childhood series) Never done    Hepatitis C screen  Never done    Cervical cancer screen  09/28/2020    COVID-19 Vaccine (3 - Booster for Pfizer series) 06/15/2021    Flu vaccine (1) 08/01/2022    Depression Monitoring  02/22/2023    DTaP/Tdap/Td vaccine (2 - Td or Tdap) 07/24/2028    HIV screen  Completed    Hepatitis A vaccine  Aged Out    Hib vaccine  Aged Out    Meningococcal (ACWY) vaccine  Aged Out    Pneumococcal 0-64 years Vaccine  Aged Out

## 2023-01-20 ENCOUNTER — TELEMEDICINE (OUTPATIENT)
Dept: FAMILY MEDICINE CLINIC | Age: 33
End: 2023-01-20
Payer: MEDICARE

## 2023-01-20 DIAGNOSIS — R51.9 SCALP PAIN: ICD-10-CM

## 2023-01-20 DIAGNOSIS — G43.001 MIGRAINE WITHOUT AURA AND WITH STATUS MIGRAINOSUS, NOT INTRACTABLE: Primary | ICD-10-CM

## 2023-01-20 PROCEDURE — 98967 PH1 ASSMT&MGMT NQHP 11-20: CPT | Performed by: NURSE PRACTITIONER

## 2023-01-20 RX ORDER — BUTALBITAL, ACETAMINOPHEN AND CAFFEINE 50; 325; 40 MG/1; MG/1; MG/1
1 TABLET ORAL
Qty: 60 TABLET | Refills: 0 | Status: SHIPPED | OUTPATIENT
Start: 2023-01-20

## 2023-01-20 SDOH — ECONOMIC STABILITY: FOOD INSECURITY: WITHIN THE PAST 12 MONTHS, YOU WORRIED THAT YOUR FOOD WOULD RUN OUT BEFORE YOU GOT MONEY TO BUY MORE.: NEVER TRUE

## 2023-01-20 SDOH — ECONOMIC STABILITY: FOOD INSECURITY: WITHIN THE PAST 12 MONTHS, THE FOOD YOU BOUGHT JUST DIDN'T LAST AND YOU DIDN'T HAVE MONEY TO GET MORE.: NEVER TRUE

## 2023-01-20 ASSESSMENT — PATIENT HEALTH QUESTIONNAIRE - PHQ9
7. TROUBLE CONCENTRATING ON THINGS, SUCH AS READING THE NEWSPAPER OR WATCHING TELEVISION: 0
6. FEELING BAD ABOUT YOURSELF - OR THAT YOU ARE A FAILURE OR HAVE LET YOURSELF OR YOUR FAMILY DOWN: 0
2. FEELING DOWN, DEPRESSED OR HOPELESS: 0
4. FEELING TIRED OR HAVING LITTLE ENERGY: 0
SUM OF ALL RESPONSES TO PHQ QUESTIONS 1-9: 0
SUM OF ALL RESPONSES TO PHQ QUESTIONS 1-9: 0
8. MOVING OR SPEAKING SO SLOWLY THAT OTHER PEOPLE COULD HAVE NOTICED. OR THE OPPOSITE, BEING SO FIGETY OR RESTLESS THAT YOU HAVE BEEN MOVING AROUND A LOT MORE THAN USUAL: 0
5. POOR APPETITE OR OVEREATING: 0
SUM OF ALL RESPONSES TO PHQ QUESTIONS 1-9: 0
1. LITTLE INTEREST OR PLEASURE IN DOING THINGS: 0
3. TROUBLE FALLING OR STAYING ASLEEP: 0
9. THOUGHTS THAT YOU WOULD BE BETTER OFF DEAD, OR OF HURTING YOURSELF: 0
SUM OF ALL RESPONSES TO PHQ9 QUESTIONS 1 & 2: 0
10. IF YOU CHECKED OFF ANY PROBLEMS, HOW DIFFICULT HAVE THESE PROBLEMS MADE IT FOR YOU TO DO YOUR WORK, TAKE CARE OF THINGS AT HOME, OR GET ALONG WITH OTHER PEOPLE: 0
SUM OF ALL RESPONSES TO PHQ QUESTIONS 1-9: 0

## 2023-01-20 ASSESSMENT — SOCIAL DETERMINANTS OF HEALTH (SDOH): HOW HARD IS IT FOR YOU TO PAY FOR THE VERY BASICS LIKE FOOD, HOUSING, MEDICAL CARE, AND HEATING?: NOT HARD AT ALL

## 2023-01-20 NOTE — PROGRESS NOTES
Mae Bray is a 28 y.o. female evaluated via telephone on 1/20/2023 for Headache (Fairly new, started a week ago, using ibuprofen, thought it was migraine due to light sensitivity, having light nausea, mainly rt side of head with scalp tenderness, constant dull ache. Has cut back on caffeine. Meds on med list are prescribed by psych and she has been unable to get into appt)  . Documentation:  I communicated with the patient and/or health care decision maker about:  . Patient calling in today for discussion about new onset headaches/migraines. They have been mostly affecting the right side of her head and also causing scalp pain and vision changes. States she has not really had a major history of this throughout her life. States she did move recently and is feeling so unmotivated to do anything productive because of the pain and wants to sleep all the time. States she does have a history of eyestrain and has not seen her eye doctor recently. Denies any head injury or fall. Has been using ibuprofen but it only last for short time and comes right back. Denies dizziness or recent illness or sinus changes. Has never had any brain imaging. Denies any rash. Details of this discussion including any medical advice provided:   Patient advised I sent over Fioricet to trial every 6-8 hours as needed for the migraine. Okay to continue to alternate with ibuprofen also and can trial magnesium supplement if desired. Will check MRI is this a new problem for her. Also advised to follow-up with her eye doctor for a formal eye exam.  Continue to push fluids is much as possible and rest when able. Please go to the ER if significantly worsening or any worsening vision. Patient agrees to plan. Patient given phone number for scheduling     Diagnosis Orders   1.  Migraine without aura and with status migrainosus, not intractable  butalbital-acetaminophen-caffeine (FIORICET, ESGIC) -40 MG per tablet MRI BRAIN WO CONTRAST      2. Scalp pain  butalbital-acetaminophen-caffeine (FIORICET, ESGIC) -40 MG per tablet            Total Time: minutes: 11-20 minutes    Adriana Mayer was evaluated through a synchronous (real-time) audio encounter. Patient identification was verified at the start of the visit. She (or guardian if applicable) is aware that this is a billable service, which includes applicable co-pays. This visit was conducted with the patient's (and/or legal guardian's) verbal consent. She has not had a related appointment within my department in the past 7 days or scheduled within the next 24 hours. The patient was located at Home: [address] [place] [address].   The provider was located at Home ([facility]): [place].    Note: not billable if this call serves to triage the patient into an appointment for the relevant concern    Carolyn Jane, APRN - CNP

## 2023-01-20 NOTE — PROGRESS NOTES
Visit Information    Have you changed or started any medications since your last visit including any over-the-counter medicines, vitamins, or herbal medicines? no   Have you stopped taking any of your medications? Is so, why? -  no  Are you having any side effects from any of your medications? - no    Have you seen any other physician or provider since your last visit?  no   Have you had any other diagnostic tests since your last visit?  no   Have you been seen in the emergency room and/or had an admission in a hospital since we last saw you?  no   Have you had your routine dental cleaning in the past 6 months?  no     Do you have an active MyChart account? If no, what is the barrier?   Yes    Patient Care Team:  Sandor Martin PA-C as PCP - General (Family Medicine)  Sandor Martin PA-C as PCP - [facility]    Medical History Review  Past Medical, Family, and Social History reviewed and  contribute to the patient presenting condition    Health Maintenance   Topic Date Due    Varicella vaccine (1 of 2 - 2-dose childhood series) Never done    Hepatitis C screen  Never done    Cervical cancer screen  09/28/2020    COVID-19 Vaccine (3 - Booster for Pfizer series) 06/15/2021    Flu vaccine (1) 08/01/2022    Depression Monitoring  02/22/2023    DTaP/Tdap/Td vaccine (2 - Td or Tdap) 07/24/2028    HIV screen  Completed    Hepatitis A vaccine  Aged Out    Hib vaccine  Aged Out    Meningococcal (ACWY) vaccine  Aged Out    Pneumococcal 0-64 years Vaccine  Aged Out

## 2023-03-20 ENCOUNTER — OFFICE VISIT (OUTPATIENT)
Dept: FAMILY MEDICINE CLINIC | Age: 33
End: 2023-03-20
Payer: MEDICAID

## 2023-03-20 VITALS
OXYGEN SATURATION: 99 % | SYSTOLIC BLOOD PRESSURE: 116 MMHG | HEIGHT: 64 IN | BODY MASS INDEX: 33.29 KG/M2 | DIASTOLIC BLOOD PRESSURE: 82 MMHG | TEMPERATURE: 97.8 F | WEIGHT: 195 LBS | HEART RATE: 101 BPM

## 2023-03-20 DIAGNOSIS — M54.50 CHRONIC MIDLINE LOW BACK PAIN WITHOUT SCIATICA: Primary | ICD-10-CM

## 2023-03-20 DIAGNOSIS — M25.551 RIGHT HIP PAIN: ICD-10-CM

## 2023-03-20 DIAGNOSIS — E66.09 CLASS 1 OBESITY DUE TO EXCESS CALORIES WITHOUT SERIOUS COMORBIDITY WITH BODY MASS INDEX (BMI) OF 33.0 TO 33.9 IN ADULT: ICD-10-CM

## 2023-03-20 DIAGNOSIS — G89.29 CHRONIC MIDLINE LOW BACK PAIN WITHOUT SCIATICA: Primary | ICD-10-CM

## 2023-03-20 PROCEDURE — 99213 OFFICE O/P EST LOW 20 MIN: CPT | Performed by: PHYSICIAN ASSISTANT

## 2023-03-20 RX ORDER — NORELGESTROMIN AND ETHINYL ESTRADIOL 150; 35 UG/D; UG/D
1 PATCH TRANSDERMAL
COMMUNITY
Start: 2023-03-16

## 2023-03-20 SDOH — ECONOMIC STABILITY: FOOD INSECURITY: WITHIN THE PAST 12 MONTHS, THE FOOD YOU BOUGHT JUST DIDN'T LAST AND YOU DIDN'T HAVE MONEY TO GET MORE.: SOMETIMES TRUE

## 2023-03-20 SDOH — ECONOMIC STABILITY: HOUSING INSECURITY
IN THE LAST 12 MONTHS, WAS THERE A TIME WHEN YOU DID NOT HAVE A STEADY PLACE TO SLEEP OR SLEPT IN A SHELTER (INCLUDING NOW)?: NO

## 2023-03-20 SDOH — ECONOMIC STABILITY: INCOME INSECURITY: HOW HARD IS IT FOR YOU TO PAY FOR THE VERY BASICS LIKE FOOD, HOUSING, MEDICAL CARE, AND HEATING?: NOT VERY HARD

## 2023-03-20 SDOH — ECONOMIC STABILITY: FOOD INSECURITY: WITHIN THE PAST 12 MONTHS, YOU WORRIED THAT YOUR FOOD WOULD RUN OUT BEFORE YOU GOT MONEY TO BUY MORE.: SOMETIMES TRUE

## 2023-03-20 ASSESSMENT — ENCOUNTER SYMPTOMS
NAUSEA: 0
VOMITING: 0
COLOR CHANGE: 0
BACK PAIN: 1
DIARRHEA: 0
CONSTIPATION: 0
ABDOMINAL PAIN: 0

## 2023-03-20 NOTE — PROGRESS NOTES
Visit Information    Have you changed or started any medications since your last visit including any over-the-counter medicines, vitamins, or herbal medicines? no   Are you having any side effects from any of your medications? -  no  Have you stopped taking any of your medications? Is so, why? -  no    Have you seen any other physician or provider since your last visit? No  Have you had any other diagnostic tests since your last visit? No  Have you been seen in the emergency room and/or had an admission to a hospital since we last saw you? No  Have you had your routine dental cleaning in the past 6 months? no    Have you activated your [email or web address] account? If not, what are your barriers?  Yes     Patient Care Team:  Mayra Daniels PA-C as PCP - General (Family Medicine)  Mayra Daniels PA-C as PCP - Empaneled Provider    Medical History Review  Past Medical, Family, and Social History reviewed and  contribute to the patient presenting condition    Health Maintenance   Topic Date Due    Varicella vaccine (1 of 2 - 2-dose childhood series) Never done    Hepatitis C screen  Never done    Cervical cancer screen  09/28/2020    COVID-19 Vaccine (3 - Booster for Pfizer series) 06/15/2021    Flu vaccine (1) 08/01/2022    Depression Monitoring  01/20/2024    DTaP/Tdap/Td vaccine (2 - Td or Tdap) 07/24/2028    HIV screen  Completed    Hepatitis A vaccine  Aged Out    Hib vaccine  Aged Out    Meningococcal (ACWY) vaccine  Aged Out    Pneumococcal 0-64 years Vaccine  Aged Out
low back pain without sciatica  Amb External Referral To Physical Therapy      2. Right hip pain  Amb External Referral To Physical Therapy      3. Class 1 obesity due to excess calories without serious comorbidity with body mass index (BMI) of 33.0 to 33.9 in adult  Amb Referral to Nutrition Services                Plan:      Return if symptoms worsen or fail to improve. Patient and I discussed starting physical therapy to help with range of motion and discomfort. She agreed with this at this time. If persisting will need to get imaging done. She will call for an appointment with local PT and go from there. Okay to continue OTC ibuprofen with food and heat/ice topically as needed  We also discussed the benefit of weight loss on the joints. She is interested in meeting with a dietitian at this time. Referral given. Ear is doing well presently, no flushing needed  Patient agreed with treatment plan    Orders Placed This Encounter   Procedures    Amb External Referral To Physical Therapy     Referral Priority:   Routine     Referral Type:   Consult for Advice and Opinion     Referral Reason:   Specialty Services Required     Requested Specialty:   Physical Therapist     Number of Visits Requested:   1    Amb Referral to Nutrition Services     Referral Priority:   Routine     Referral Type:   Eval and Treat     Referral Reason:   Specialty Services Required     Requested Specialty:   Nutrition     Number of Visits Requested:   1         Patient given educational materials - see patient instructions. Discussed use, benefit, and side effects of prescribed medications. All patientquestions answered. Pt voiced understanding. Reviewed health maintenance. Instructedto continue current medications, diet and exercise. Patient agreed with treatmentplan. Follow up as directed. Please note that this chart was generated using voice recognition Dragon dictation software.   Although every effort was made to ensure

## 2023-05-05 ENCOUNTER — TELEMEDICINE (OUTPATIENT)
Dept: FAMILY MEDICINE CLINIC | Age: 33
End: 2023-05-05
Payer: MEDICAID

## 2023-05-05 DIAGNOSIS — N76.0 ACUTE VAGINITIS: Primary | ICD-10-CM

## 2023-05-05 PROCEDURE — 99213 OFFICE O/P EST LOW 20 MIN: CPT | Performed by: PHYSICIAN ASSISTANT

## 2023-05-05 RX ORDER — FLUCONAZOLE 100 MG/1
100 TABLET ORAL DAILY
Qty: 3 TABLET | Refills: 0 | Status: SHIPPED | OUTPATIENT
Start: 2023-05-05 | End: 2023-05-08

## 2023-05-05 ASSESSMENT — ENCOUNTER SYMPTOMS: COLOR CHANGE: 0

## 2023-05-05 NOTE — PROGRESS NOTES
Visit Information    Have you changed or started any medications since your last visit including any over-the-counter medicines, vitamins, or herbal medicines? no   Are you having any side effects from any of your medications? -  no  Have you stopped taking any of your medications? Is so, why? -  no    Have you seen any other physician or provider since your last visit? No  Have you had any other diagnostic tests since your last visit? No  Have you been seen in the emergency room and/or had an admission to a hospital since we last saw you? No  Have you had your routine dental cleaning in the past 6 months? no    Have you activated your [employer] account? If not, what are your barriers?  Yes     Patient Care Team:  Mayra Daniels PA-C as PCP - General (Family Medicine)  Mayra Daniels PA-C as PCP - Empaneled Provider    Medical History Review  Past Medical, Family, and Social History reviewed and  contribute to the patient presenting condition    Health Maintenance   Topic Date Due    Varicella vaccine (1 of 2 - 2-dose childhood series) Never done    Hepatitis C screen  Never done    Cervical cancer screen  09/28/2020    COVID-19 Vaccine (3 - Booster for Pfizer series) 06/15/2021    Flu vaccine (Season Ended) 08/01/2023    Depression Monitoring  01/20/2024    DTaP/Tdap/Td vaccine (2 - Td or Tdap) 07/24/2028    HIV screen  Completed    Hepatitis A vaccine  Aged Out    Hib vaccine  Aged Out    Meningococcal (ACWY) vaccine  Aged Out    Pneumococcal 0-64 years Vaccine  Aged Out

## 2023-05-05 NOTE — PROGRESS NOTES
2023    TELEHEALTH EVALUATION -- Audio/Visual (During DMOMF-62 public health emergency)    HPI:    Anjelica Araya (:  1990) has requested an audio/video evaluation for the following concern(s):    Patient is doing a virtual visit today with concerns of a yeast infection. She states having bad itching and white discharge. Patient states symptoms have been going on 3 weeks. She tried OTC azo, juice, drinking water. No noted urinary symptoms. Last menses 2 weeks ago    Review of Systems   Constitutional:  Negative for activity change, appetite change, fatigue and fever. Genitourinary:  Positive for vaginal discharge. Negative for dysuria, frequency, hematuria, urgency, vaginal bleeding and vaginal pain. Skin:  Negative for color change, pallor, rash and wound. Neurological:  Negative for numbness. Hematological:  Negative for adenopathy. Psychiatric/Behavioral:  The patient is not nervous/anxious. Prior to Visit Medications    Medication Sig Taking? Authorizing Provider   fluconazole (DIFLUCAN) 100 MG tablet Take 1 tablet by mouth daily for 3 days Yes Chelsea Nixon PA-C   norelgestromin-ethinyl estradiol Annitta Schiller) 150-35 MCG/24HR Place 1 patch onto the skin every 7 days Yes Historical Provider, MD   butalbital-acetaminophen-caffeine (FIORICET, ESGIC) -40 MG per tablet Take 1 tablet by mouth every 6-8 hours as needed for Headaches or Migraine Yes BRIGHT Cornejo - CNP   Probiotic Product (PROBIOTIC BLEND PO) Take by mouth Yes Historical Provider, MD   HYDROXYZINE HCL PO Take by mouth Yes Historical Provider, MD   meclizine (ANTIVERT) 25 MG tablet Take 1 tablet by mouth 3 times daily as needed for Dizziness  Chelsea Nixon PA-C   clonazePAM (KLONOPIN) 0.25 MG disintegrating tablet Take 1 tablet by mouth nightly as needed for Anxiety for up to 10 days.   Analia Alarcon PA-C       Social History     Tobacco Use    Smoking status: Never    Smokeless tobacco: Never   Vaping

## 2023-05-22 ENCOUNTER — HOSPITAL ENCOUNTER (OUTPATIENT)
Dept: NUTRITION | Age: 33
Discharge: HOME | End: 2023-05-22
Payer: MEDICAID

## 2023-05-22 PROCEDURE — 97802 MEDICAL NUTRITION INDIV IN: CPT

## 2023-05-22 NOTE — PROGRESS NOTES
Nutrition Note    Patient seen for nutrition education. She reports she would like to improve the overall health of her and her . 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 Provided education r/t general healthy nutrition and setting achievable goals. Encouraged patient to find more ways to add vegetables to meals. Patient on the right track, decreased soda intake, started purchasing whole wheat breads, planning meals for the week. Encouraged her to continue with these changes as well as set goals each week to try a new vegetable, have more colorful meals, etc. Patient verbalizes understanding, questions answered.      Electronically signed by Luis Manuel Vides RD on 5/22/23 at 2:22 PM EDT    Contact: [phone]

## 2023-07-01 ENCOUNTER — APPOINTMENT (OUTPATIENT)
Dept: GENERAL RADIOLOGY | Age: 33
End: 2023-07-01
Payer: MEDICAID

## 2023-07-01 ENCOUNTER — HOSPITAL ENCOUNTER (EMERGENCY)
Age: 33
Discharge: HOME | End: 2023-07-01
Attending: EMERGENCY MEDICINE
Payer: MEDICAID

## 2023-07-01 VITALS
OXYGEN SATURATION: 98 % | BODY MASS INDEX: 33.92 KG/M2 | HEART RATE: 107 BPM | TEMPERATURE: 98.1 F | RESPIRATION RATE: 16 BRPM | HEIGHT: 64 IN | SYSTOLIC BLOOD PRESSURE: 111 MMHG | WEIGHT: 198.7 LBS | DIASTOLIC BLOOD PRESSURE: 61 MMHG

## 2023-07-01 DIAGNOSIS — M54.12 CERVICAL RADICULOPATHY: Primary | ICD-10-CM

## 2023-07-01 PROCEDURE — 72040 X-RAY EXAM NECK SPINE 2-3 VW: CPT

## 2023-07-01 PROCEDURE — 96372 THER/PROPH/DIAG INJ SC/IM: CPT

## 2023-07-01 PROCEDURE — 6360000002 HC RX W HCPCS: Performed by: EMERGENCY MEDICINE

## 2023-07-01 PROCEDURE — 6370000000 HC RX 637 (ALT 250 FOR IP): Performed by: EMERGENCY MEDICINE

## 2023-07-01 PROCEDURE — 99284 EMERGENCY DEPT VISIT MOD MDM: CPT

## 2023-07-01 PROCEDURE — 73030 X-RAY EXAM OF SHOULDER: CPT

## 2023-07-01 RX ORDER — HYDROCODONE BITARTRATE AND ACETAMINOPHEN 5; 325 MG/1; MG/1
1 TABLET ORAL EVERY 6 HOURS PRN
Qty: 10 TABLET | Refills: 0 | Status: SHIPPED | OUTPATIENT
Start: 2023-07-01 | End: 2023-07-04

## 2023-07-01 RX ORDER — PREDNISONE 20 MG/1
60 TABLET ORAL ONCE
Status: COMPLETED | OUTPATIENT
Start: 2023-07-01 | End: 2023-07-01

## 2023-07-01 RX ORDER — KETOROLAC TROMETHAMINE 10 MG/1
10 TABLET, FILM COATED ORAL EVERY 6 HOURS PRN
Qty: 20 TABLET | Refills: 0 | Status: SHIPPED | OUTPATIENT
Start: 2023-07-01

## 2023-07-01 RX ORDER — CYCLOBENZAPRINE HCL 10 MG
TABLET ORAL
COMMUNITY
Start: 2023-06-29

## 2023-07-01 RX ORDER — PREDNISONE 50 MG/1
50 TABLET ORAL DAILY
Qty: 5 TABLET | Refills: 0 | Status: SHIPPED | OUTPATIENT
Start: 2023-07-01 | End: 2023-07-06

## 2023-07-01 RX ORDER — KETOROLAC TROMETHAMINE 30 MG/ML
30 INJECTION, SOLUTION INTRAMUSCULAR; INTRAVENOUS ONCE
Status: COMPLETED | OUTPATIENT
Start: 2023-07-01 | End: 2023-07-01

## 2023-07-01 RX ADMIN — PREDNISONE 60 MG: 20 TABLET ORAL at 02:36

## 2023-07-01 RX ADMIN — KETOROLAC TROMETHAMINE 30 MG: 30 INJECTION, SOLUTION INTRAMUSCULAR; INTRAVENOUS at 02:36

## 2023-07-01 ASSESSMENT — ENCOUNTER SYMPTOMS
SORE THROAT: 0
NAUSEA: 0
VOMITING: 0
COLOR CHANGE: 0
EYE DISCHARGE: 0
COUGH: 0
RHINORRHEA: 0
EYE REDNESS: 0
DIARRHEA: 0
SHORTNESS OF BREATH: 0

## 2023-07-01 ASSESSMENT — PAIN SCALES - GENERAL
PAINLEVEL_OUTOF10: 8
PAINLEVEL_OUTOF10: 6
PAINLEVEL_OUTOF10: 7
PAINLEVEL_OUTOF10: 3

## 2023-07-01 ASSESSMENT — PAIN DESCRIPTION - LOCATION
LOCATION: NECK;SHOULDER
LOCATION: SHOULDER;NECK

## 2023-07-01 ASSESSMENT — LIFESTYLE VARIABLES
HOW MANY STANDARD DRINKS CONTAINING ALCOHOL DO YOU HAVE ON A TYPICAL DAY: PATIENT DOES NOT DRINK
HOW OFTEN DO YOU HAVE A DRINK CONTAINING ALCOHOL: NEVER

## 2023-07-01 ASSESSMENT — PAIN DESCRIPTION - ORIENTATION
ORIENTATION: LEFT

## 2023-07-01 ASSESSMENT — PAIN DESCRIPTION - FREQUENCY: FREQUENCY: CONTINUOUS

## 2023-07-01 ASSESSMENT — PAIN - FUNCTIONAL ASSESSMENT: PAIN_FUNCTIONAL_ASSESSMENT: 0-10

## 2023-07-01 ASSESSMENT — PAIN DESCRIPTION - DESCRIPTORS: DESCRIPTORS: OTHER (COMMENT)

## 2023-08-22 ENCOUNTER — TELEPHONE (OUTPATIENT)
Dept: FAMILY MEDICINE CLINIC | Age: 33
End: 2023-08-22

## 2023-08-22 NOTE — TELEPHONE ENCOUNTER
Patient called to make a appointment for a possible yeast infection. Pt appt was made but then patient was called back due to writer noticing that we no longer take her insurance. PT was advised we do not take her insurance. PT became upset and started to raise her voice. Writer advised pt to call medicaid to do a just cause transition. Pt stated \" why the hell is that my fault that you no longer take my insurance. \" \"I just switched to anther. \" Binh Harper and stated that it was because [facility] did not come to an agreement. As patient was still yelling she stated\" why is it my fucking fault. I am not sitting on hold with medicaid for three hours. \" I advised pt to either change insurance or she can be seen at a urgent care that is in network with her insurance. Pt then stated\" give me the fucking number. \" Writer advised pt not to cuss. Pt stated ' just give me the god damn number. \" Eddie Manuel gave patient the number and then disconnected the call.

## 2025-08-25 ENCOUNTER — OFFICE VISIT (OUTPATIENT)
Dept: FAMILY MEDICINE CLINIC | Age: 35
End: 2025-08-25
Payer: COMMERCIAL

## 2025-08-25 VITALS
HEART RATE: 88 BPM | WEIGHT: 202 LBS | TEMPERATURE: 97.1 F | DIASTOLIC BLOOD PRESSURE: 62 MMHG | BODY MASS INDEX: 34.49 KG/M2 | OXYGEN SATURATION: 99 % | SYSTOLIC BLOOD PRESSURE: 118 MMHG | HEIGHT: 64 IN

## 2025-08-25 DIAGNOSIS — M75.22 BICIPITAL TENDONITIS OF LEFT SHOULDER: ICD-10-CM

## 2025-08-25 DIAGNOSIS — H61.23 BILATERAL IMPACTED CERUMEN: ICD-10-CM

## 2025-08-25 DIAGNOSIS — Z11.59 NEED FOR HEPATITIS C SCREENING TEST: ICD-10-CM

## 2025-08-25 DIAGNOSIS — Z13.29 SCREENING FOR THYROID DISORDER: ICD-10-CM

## 2025-08-25 DIAGNOSIS — Z00.00 ENCOUNTER FOR WELL ADULT EXAM WITHOUT ABNORMAL FINDINGS: Primary | ICD-10-CM

## 2025-08-25 DIAGNOSIS — R06.2 WHEEZING: ICD-10-CM

## 2025-08-25 DIAGNOSIS — Z13.1 SCREENING FOR DIABETES MELLITUS: ICD-10-CM

## 2025-08-25 DIAGNOSIS — Z13.220 SCREENING, LIPID: ICD-10-CM

## 2025-08-25 PROCEDURE — 99395 PREV VISIT EST AGE 18-39: CPT | Performed by: PHYSICIAN ASSISTANT

## 2025-08-25 PROCEDURE — 69210 REMOVE IMPACTED EAR WAX UNI: CPT | Performed by: PHYSICIAN ASSISTANT

## 2025-08-25 PROCEDURE — 99214 OFFICE O/P EST MOD 30 MIN: CPT | Performed by: PHYSICIAN ASSISTANT

## 2025-08-25 PROCEDURE — 1036F TOBACCO NON-USER: CPT | Performed by: PHYSICIAN ASSISTANT

## 2025-08-25 PROCEDURE — G8427 DOCREV CUR MEDS BY ELIG CLIN: HCPCS | Performed by: PHYSICIAN ASSISTANT

## 2025-08-25 PROCEDURE — G8417 CALC BMI ABV UP PARAM F/U: HCPCS | Performed by: PHYSICIAN ASSISTANT

## 2025-08-25 RX ORDER — OXCARBAZEPINE 150 MG/1
150 TABLET, FILM COATED ORAL 2 TIMES DAILY
COMMUNITY
Start: 2025-07-17

## 2025-08-25 SDOH — ECONOMIC STABILITY: FOOD INSECURITY: WITHIN THE PAST 12 MONTHS, YOU WORRIED THAT YOUR FOOD WOULD RUN OUT BEFORE YOU GOT MONEY TO BUY MORE.: SOMETIMES TRUE

## 2025-08-25 SDOH — ECONOMIC STABILITY: FOOD INSECURITY: WITHIN THE PAST 12 MONTHS, THE FOOD YOU BOUGHT JUST DIDN'T LAST AND YOU DIDN'T HAVE MONEY TO GET MORE.: SOMETIMES TRUE

## 2025-08-25 ASSESSMENT — ENCOUNTER SYMPTOMS
CONSTIPATION: 0
ABDOMINAL PAIN: 0
COLOR CHANGE: 0
SHORTNESS OF BREATH: 0
SORE THROAT: 0
EYE DISCHARGE: 0
NAUSEA: 0
DIARRHEA: 0
EYE PAIN: 0
SINUS PAIN: 0
VOICE CHANGE: 0
BACK PAIN: 0
TROUBLE SWALLOWING: 0
WHEEZING: 1
VOMITING: 0
RHINORRHEA: 0
COUGH: 1
CHEST TIGHTNESS: 0
BLOOD IN STOOL: 0
SINUS PRESSURE: 0

## 2025-08-25 ASSESSMENT — PATIENT HEALTH QUESTIONNAIRE - PHQ9
7. TROUBLE CONCENTRATING ON THINGS, SUCH AS READING THE NEWSPAPER OR WATCHING TELEVISION: NOT AT ALL
8. MOVING OR SPEAKING SO SLOWLY THAT OTHER PEOPLE COULD HAVE NOTICED. OR THE OPPOSITE, BEING SO FIGETY OR RESTLESS THAT YOU HAVE BEEN MOVING AROUND A LOT MORE THAN USUAL: NOT AT ALL
SUM OF ALL RESPONSES TO PHQ QUESTIONS 1-9: 0
2. FEELING DOWN, DEPRESSED OR HOPELESS: NOT AT ALL
SUM OF ALL RESPONSES TO PHQ QUESTIONS 1-9: 0
SUM OF ALL RESPONSES TO PHQ QUESTIONS 1-9: 0
1. LITTLE INTEREST OR PLEASURE IN DOING THINGS: NOT AT ALL
9. THOUGHTS THAT YOU WOULD BE BETTER OFF DEAD, OR OF HURTING YOURSELF: NOT AT ALL
3. TROUBLE FALLING OR STAYING ASLEEP: NOT AT ALL
4. FEELING TIRED OR HAVING LITTLE ENERGY: NOT AT ALL
SUM OF ALL RESPONSES TO PHQ QUESTIONS 1-9: 0
5. POOR APPETITE OR OVEREATING: NOT AT ALL
10. IF YOU CHECKED OFF ANY PROBLEMS, HOW DIFFICULT HAVE THESE PROBLEMS MADE IT FOR YOU TO DO YOUR WORK, TAKE CARE OF THINGS AT HOME, OR GET ALONG WITH OTHER PEOPLE: NOT DIFFICULT AT ALL
6. FEELING BAD ABOUT YOURSELF - OR THAT YOU ARE A FAILURE OR HAVE LET YOURSELF OR YOUR FAMILY DOWN: NOT AT ALL